# Patient Record
Sex: FEMALE | Race: ASIAN | NOT HISPANIC OR LATINO
[De-identification: names, ages, dates, MRNs, and addresses within clinical notes are randomized per-mention and may not be internally consistent; named-entity substitution may affect disease eponyms.]

---

## 2018-07-10 ENCOUNTER — APPOINTMENT (OUTPATIENT)
Dept: OTOLARYNGOLOGY | Facility: CLINIC | Age: 53
End: 2018-07-10

## 2021-11-09 ENCOUNTER — NON-APPOINTMENT (OUTPATIENT)
Age: 56
End: 2021-11-09

## 2021-11-10 ENCOUNTER — APPOINTMENT (OUTPATIENT)
Dept: ORTHOPEDIC SURGERY | Facility: CLINIC | Age: 56
End: 2021-11-10
Payer: COMMERCIAL

## 2021-11-10 DIAGNOSIS — Z83.3 FAMILY HISTORY OF DIABETES MELLITUS: ICD-10-CM

## 2021-11-10 DIAGNOSIS — Z86.39 PERSONAL HISTORY OF OTHER ENDOCRINE, NUTRITIONAL AND METABOLIC DISEASE: ICD-10-CM

## 2021-11-10 DIAGNOSIS — Z78.9 OTHER SPECIFIED HEALTH STATUS: ICD-10-CM

## 2021-11-10 DIAGNOSIS — M54.50 LOW BACK PAIN, UNSPECIFIED: ICD-10-CM

## 2021-11-10 DIAGNOSIS — M51.36 OTHER INTERVERTEBRAL DISC DEGENERATION, LUMBAR REGION: ICD-10-CM

## 2021-11-10 DIAGNOSIS — M77.32 CALCANEAL SPUR, LEFT FOOT: ICD-10-CM

## 2021-11-10 DIAGNOSIS — Z82.49 FAMILY HISTORY OF ISCHEMIC HEART DISEASE AND OTHER DISEASES OF THE CIRCULATORY SYSTEM: ICD-10-CM

## 2021-11-10 DIAGNOSIS — Z80.3 FAMILY HISTORY OF MALIGNANT NEOPLASM OF BREAST: ICD-10-CM

## 2021-11-10 PROCEDURE — 99204 OFFICE O/P NEW MOD 45 MIN: CPT

## 2021-11-10 RX ORDER — TIZANIDINE 2 MG/1
2 TABLET ORAL EVERY 8 HOURS
Qty: 30 | Refills: 2 | Status: ACTIVE | COMMUNITY
Start: 2021-11-10 | End: 1900-01-01

## 2021-11-10 RX ORDER — MELOXICAM 7.5 MG/1
7.5 TABLET ORAL DAILY
Qty: 30 | Refills: 0 | Status: ACTIVE | COMMUNITY
Start: 2021-11-10 | End: 1900-01-01

## 2021-11-10 RX ORDER — EZETIMIBE 10 MG/1
10 TABLET ORAL
Refills: 0 | Status: ACTIVE | COMMUNITY

## 2021-11-10 RX ORDER — MELATONIN 3 MG
TABLET ORAL
Refills: 0 | Status: ACTIVE | COMMUNITY

## 2021-11-10 NOTE — DISCUSSION/SUMMARY
[Medication Risks Reviewed] : Medication risks reviewed [de-identified] : The patient presents with back pain and difficulty changing positions. Her symptoms are suggestive of discogenic source of pain. Discussed the option of a Toradol injection today but she is blind. I recommended the use of a lumbar corset for supportive relief. Prescribed her meloxicam and tizanidine as well for symptomatic relief. She is traveling to [place] next Thursday and can come back for a Toradol injection if she wants prior to her travels. Prescribed her physical therapy as well. If her symptoms persist over the next 2 weeks MRI lumbar spine may be considered at that clinically he does not appear to have an occult fracture but this can be considered based on response to medications and treatment.\par \par The patient was educated regarding their condition, treatment options as well as prescribed course of treatment. \par Risks and benefits as well as alternatives to the proposed treatment were also provided to the patient \par They were given the opportunity to have all their questions answered to their satisfaction.\par \par Vital signs were reviewed with the patient and the patient was instructed to followup with their primary care provider for further management.\par \par Healthy lifestyle recommendations were also made including a tobacco free lifestyle, proper diet, and weight control.

## 2021-11-10 NOTE — CONSULT LETTER
[Dear  ___] : Dear  [unfilled], [Consult Letter:] : I had the pleasure of evaluating your patient, [unfilled]. [FreeTextEntry2] : Saeid Lloyd [FreeTextEntry1] : Thank you for this referral. I have enclosed my note for your review. Please feel free to contact my office if you have additional questions regarding this patient.\par \par Regards,\par Jaden Seals MD, FACS, FAAOS\par \par  of Orthopaedic Surgery\par [facility] [employer]\par Spinal Reconstruction Surgery\par Minimally Invasive Spinal Surgery\par [facility]

## 2021-11-10 NOTE — HISTORY OF PRESENT ILLNESS
[8] : a current pain level of 8/10 [Daily] : ~He/She~ states the symptoms seem to be occuring daily [Rest] : relieved by rest [Worsening] : worsening [___ wks] : [unfilled] week(s) ago [Prolonged Sitting] : worsened by prolonged sitting [de-identified] : Patient fell on 10/16/21 banged back of head on counter and landed on her buttocks states did not lose consciousness. Patient called her PCP told her that she was probably bruised 2 days later saw her PCP sent for xrays of lumbar spine negative for fractures given naprosyn stopped after a few days of taking due to upset stomach.\par Midline low back pain primarily.\par No PT yet. 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  [de-identified] : sitting to standing

## 2021-11-10 NOTE — PHYSICAL EXAM
[Normal] : Gait: normal [LE] : Sensory: Intact in bilateral lower extremities [1+] : left ankle jerk 1+ [DP] : dorsalis pedis 2+ and symmetric bilaterally [SLR] : negative straight leg raise [Plantar Reflex Right Only] : absent on the right [Plantar Reflex Left Only] : absent on the left [DTR Reflexes Clonus Of Right Ankle (___ Beats)] : absent on the right [DTR Reflexes Clonus Of Left Ankle (___ Beats)] : absent on the left [de-identified] : The pt is awake, alert and oriented to self, place and time, is comfortable and in no acute distress. Inspection of neck, back and lower extremities bilaterally reveals no rashes or ecchymotic lesions.  There is no obvious abnormal spinal curvature in the sagittal and coronal planes. There is no tenderness over the cervical, thoracic or lumbar spine, or the paraspinal or upper and lower extremities musculature. There is no sacroiliac tenderness. No greater trochanteric tenderness bilaterally. No atrophy or abnormal movements noted in the upper or lower extremities. There is no swelling noted in the upper or lower extremities bilaterally. No cervical lymphadenopathy noted anteriorly. No joint laxity noted in the upper and lower extremity joints bilaterally.\par Hip range of motion is degrees internal rotation 30° external rotation without pain. Full range of motion of the shoulders bilaterally with no significant pain\par There is no groin pain with hip internal rotation and a negative LYSSA test bilaterally.  [de-identified] : bilateral gluteal tenderness\par flex to mid tibia, ext 30 degrees\par LEFT PLANTATR TENDERNESS [de-identified] : X-rays of the lumbar spine sacrum and coccyx performed at [facility] imaging on October 20, 2021 demonstrate transitional vertebra with rudimentary S1-S2 disc and transitional level at S1. Sacroiliac joints are maintained. No acute fractures. These images were independently reviewed by me and discussed with the patient.

## 2024-02-02 ENCOUNTER — INPATIENT (INPATIENT)
Facility: HOSPITAL | Age: 59
LOS: 3 days | Discharge: HOME | End: 2024-02-06
Attending: INTERNAL MEDICINE | Admitting: INTERNAL MEDICINE
Payer: COMMERCIAL

## 2024-02-02 VITALS
HEART RATE: 96 BPM | RESPIRATION RATE: 18 BRPM | DIASTOLIC BLOOD PRESSURE: 96 MMHG | OXYGEN SATURATION: 99 % | SYSTOLIC BLOOD PRESSURE: 166 MMHG | TEMPERATURE: 98 F

## 2024-02-02 DIAGNOSIS — R07.9 CHEST PAIN, UNSPECIFIED: ICD-10-CM

## 2024-02-02 DIAGNOSIS — E11.65 TYPE 2 DIABETES MELLITUS WITH HYPERGLYCEMIA: ICD-10-CM

## 2024-02-02 DIAGNOSIS — Z29.9 ENCOUNTER FOR PROPHYLACTIC MEASURES, UNSPECIFIED: ICD-10-CM

## 2024-02-02 DIAGNOSIS — Z87.11 PERSONAL HISTORY OF PEPTIC ULCER DISEASE: ICD-10-CM

## 2024-02-02 DIAGNOSIS — E03.9 HYPOTHYROIDISM, UNSPECIFIED: ICD-10-CM

## 2024-02-02 DIAGNOSIS — I21.4 NON-ST ELEVATION (NSTEMI) MYOCARDIAL INFARCTION: ICD-10-CM

## 2024-02-02 DIAGNOSIS — E78.5 HYPERLIPIDEMIA, UNSPECIFIED: ICD-10-CM

## 2024-02-02 LAB
A1C WITH ESTIMATED AVERAGE GLUCOSE RESULT: 8.3 % — HIGH (ref 4–5.6)
ALBUMIN SERPL ELPH-MCNC: 4.4 G/DL — SIGNIFICANT CHANGE UP (ref 3.3–5)
ALP SERPL-CCNC: 127 U/L — HIGH (ref 40–120)
ALT FLD-CCNC: 24 U/L — SIGNIFICANT CHANGE UP (ref 4–33)
ANION GAP SERPL CALC-SCNC: 15 MMOL/L — HIGH (ref 7–14)
APTT BLD: 31.2 SEC — SIGNIFICANT CHANGE UP (ref 24.5–35.6)
AST SERPL-CCNC: 29 U/L — SIGNIFICANT CHANGE UP (ref 4–32)
BASOPHILS # BLD AUTO: 0.05 K/UL — SIGNIFICANT CHANGE UP (ref 0–0.2)
BASOPHILS NFR BLD AUTO: 0.5 % — SIGNIFICANT CHANGE UP (ref 0–2)
BILIRUB SERPL-MCNC: 0.4 MG/DL — SIGNIFICANT CHANGE UP (ref 0.2–1.2)
BUN SERPL-MCNC: 17 MG/DL — SIGNIFICANT CHANGE UP (ref 7–23)
CALCIUM SERPL-MCNC: 10.2 MG/DL — SIGNIFICANT CHANGE UP (ref 8.4–10.5)
CHLORIDE SERPL-SCNC: 98 MMOL/L — SIGNIFICANT CHANGE UP (ref 98–107)
CK MB BLD-MCNC: 8.7 % — HIGH (ref 0–2.5)
CK MB CFR SERPL CALC: 32.6 NG/ML — HIGH
CK SERPL-CCNC: 375 U/L — HIGH (ref 25–170)
CO2 SERPL-SCNC: 23 MMOL/L — SIGNIFICANT CHANGE UP (ref 22–31)
CREAT SERPL-MCNC: 0.7 MG/DL — SIGNIFICANT CHANGE UP (ref 0.5–1.3)
EGFR: 101 ML/MIN/1.73M2 — SIGNIFICANT CHANGE UP
EOSINOPHIL # BLD AUTO: 0.17 K/UL — SIGNIFICANT CHANGE UP (ref 0–0.5)
EOSINOPHIL NFR BLD AUTO: 1.6 % — SIGNIFICANT CHANGE UP (ref 0–6)
ESTIMATED AVERAGE GLUCOSE: 192 — SIGNIFICANT CHANGE UP
GLUCOSE BLDC GLUCOMTR-MCNC: 178 MG/DL — HIGH (ref 70–99)
GLUCOSE BLDC GLUCOMTR-MCNC: 213 MG/DL — HIGH (ref 70–99)
GLUCOSE SERPL-MCNC: 345 MG/DL — HIGH (ref 70–99)
HCT VFR BLD CALC: 40.7 % — SIGNIFICANT CHANGE UP (ref 34.5–45)
HCT VFR BLD CALC: 43.2 % — SIGNIFICANT CHANGE UP (ref 34.5–45)
HGB BLD-MCNC: 12.9 G/DL — SIGNIFICANT CHANGE UP (ref 11.5–15.5)
HGB BLD-MCNC: 14.1 G/DL — SIGNIFICANT CHANGE UP (ref 11.5–15.5)
IANC: 5.86 K/UL — SIGNIFICANT CHANGE UP (ref 1.8–7.4)
IMM GRANULOCYTES NFR BLD AUTO: 0.3 % — SIGNIFICANT CHANGE UP (ref 0–0.9)
INR BLD: 0.92 RATIO — SIGNIFICANT CHANGE UP (ref 0.85–1.18)
LYMPHOCYTES # BLD AUTO: 3.6 K/UL — HIGH (ref 1–3.3)
LYMPHOCYTES # BLD AUTO: 34.5 % — SIGNIFICANT CHANGE UP (ref 13–44)
MCHC RBC-ENTMCNC: 26.3 PG — LOW (ref 27–34)
MCHC RBC-ENTMCNC: 26.6 PG — LOW (ref 27–34)
MCHC RBC-ENTMCNC: 31.7 GM/DL — LOW (ref 32–36)
MCHC RBC-ENTMCNC: 32.6 GM/DL — SIGNIFICANT CHANGE UP (ref 32–36)
MCV RBC AUTO: 81.5 FL — SIGNIFICANT CHANGE UP (ref 80–100)
MCV RBC AUTO: 82.9 FL — SIGNIFICANT CHANGE UP (ref 80–100)
MONOCYTES # BLD AUTO: 0.72 K/UL — SIGNIFICANT CHANGE UP (ref 0–0.9)
MONOCYTES NFR BLD AUTO: 6.9 % — SIGNIFICANT CHANGE UP (ref 2–14)
NEUTROPHILS # BLD AUTO: 5.86 K/UL — SIGNIFICANT CHANGE UP (ref 1.8–7.4)
NEUTROPHILS NFR BLD AUTO: 56.2 % — SIGNIFICANT CHANGE UP (ref 43–77)
NRBC # BLD: 0 /100 WBCS — SIGNIFICANT CHANGE UP (ref 0–0)
NRBC # BLD: 0 /100 WBCS — SIGNIFICANT CHANGE UP (ref 0–0)
NRBC # FLD: 0 K/UL — SIGNIFICANT CHANGE UP (ref 0–0)
NRBC # FLD: 0 K/UL — SIGNIFICANT CHANGE UP (ref 0–0)
PLATELET # BLD AUTO: 240 K/UL — SIGNIFICANT CHANGE UP (ref 150–400)
PLATELET # BLD AUTO: 245 K/UL — SIGNIFICANT CHANGE UP (ref 150–400)
POTASSIUM SERPL-MCNC: 4 MMOL/L — SIGNIFICANT CHANGE UP (ref 3.5–5.3)
POTASSIUM SERPL-SCNC: 4 MMOL/L — SIGNIFICANT CHANGE UP (ref 3.5–5.3)
PROT SERPL-MCNC: 8.5 G/DL — HIGH (ref 6–8.3)
PROTHROM AB SERPL-ACNC: 10.4 SEC — SIGNIFICANT CHANGE UP (ref 9.5–13)
RBC # BLD: 4.91 M/UL — SIGNIFICANT CHANGE UP (ref 3.8–5.2)
RBC # BLD: 5.3 M/UL — HIGH (ref 3.8–5.2)
RBC # FLD: 12.9 % — SIGNIFICANT CHANGE UP (ref 10.3–14.5)
RBC # FLD: 13 % — SIGNIFICANT CHANGE UP (ref 10.3–14.5)
SODIUM SERPL-SCNC: 136 MMOL/L — SIGNIFICANT CHANGE UP (ref 135–145)
TROPONIN T, HIGH SENSITIVITY RESULT: 157 NG/L — CRITICAL HIGH
TROPONIN T, HIGH SENSITIVITY RESULT: 62 NG/L — CRITICAL HIGH
WBC # BLD: 10.43 K/UL — SIGNIFICANT CHANGE UP (ref 3.8–10.5)
WBC # BLD: 12.24 K/UL — HIGH (ref 3.8–10.5)
WBC # FLD AUTO: 10.43 K/UL — SIGNIFICANT CHANGE UP (ref 3.8–10.5)
WBC # FLD AUTO: 12.24 K/UL — HIGH (ref 3.8–10.5)

## 2024-02-02 PROCEDURE — 99223 1ST HOSP IP/OBS HIGH 75: CPT

## 2024-02-02 PROCEDURE — 71046 X-RAY EXAM CHEST 2 VIEWS: CPT | Mod: 26

## 2024-02-02 PROCEDURE — 99291 CRITICAL CARE FIRST HOUR: CPT

## 2024-02-02 RX ORDER — GLUCAGON INJECTION, SOLUTION 0.5 MG/.1ML
1 INJECTION, SOLUTION SUBCUTANEOUS ONCE
Refills: 0 | Status: DISCONTINUED | OUTPATIENT
Start: 2024-02-02 | End: 2024-02-04

## 2024-02-02 RX ORDER — ATORVASTATIN CALCIUM 80 MG/1
80 TABLET, FILM COATED ORAL AT BEDTIME
Refills: 0 | Status: DISCONTINUED | OUTPATIENT
Start: 2024-02-02 | End: 2024-02-03

## 2024-02-02 RX ORDER — HEPARIN SODIUM 5000 [USP'U]/ML
INJECTION INTRAVENOUS; SUBCUTANEOUS
Qty: 25000 | Refills: 0 | Status: DISCONTINUED | OUTPATIENT
Start: 2024-02-02 | End: 2024-02-02

## 2024-02-02 RX ORDER — SODIUM CHLORIDE 9 MG/ML
1000 INJECTION, SOLUTION INTRAVENOUS
Refills: 0 | Status: DISCONTINUED | OUTPATIENT
Start: 2024-02-02 | End: 2024-02-04

## 2024-02-02 RX ORDER — PANTOPRAZOLE SODIUM 20 MG/1
40 TABLET, DELAYED RELEASE ORAL
Refills: 0 | Status: DISCONTINUED | OUTPATIENT
Start: 2024-02-02 | End: 2024-02-06

## 2024-02-02 RX ORDER — ACETAMINOPHEN 500 MG
975 TABLET ORAL ONCE
Refills: 0 | Status: COMPLETED | OUTPATIENT
Start: 2024-02-02 | End: 2024-02-02

## 2024-02-02 RX ORDER — DEXTROSE 50 % IN WATER 50 %
25 SYRINGE (ML) INTRAVENOUS ONCE
Refills: 0 | Status: DISCONTINUED | OUTPATIENT
Start: 2024-02-02 | End: 2024-02-06

## 2024-02-02 RX ORDER — MORPHINE SULFATE 50 MG/1
2 CAPSULE, EXTENDED RELEASE ORAL EVERY 4 HOURS
Refills: 0 | Status: DISCONTINUED | OUTPATIENT
Start: 2024-02-02 | End: 2024-02-03

## 2024-02-02 RX ORDER — ACETAMINOPHEN 500 MG
650 TABLET ORAL EVERY 6 HOURS
Refills: 0 | Status: DISCONTINUED | OUTPATIENT
Start: 2024-02-02 | End: 2024-02-03

## 2024-02-02 RX ORDER — TICAGRELOR 90 MG/1
180 TABLET ORAL ONCE
Refills: 0 | Status: COMPLETED | OUTPATIENT
Start: 2024-02-02 | End: 2024-02-02

## 2024-02-02 RX ORDER — ASPIRIN/CALCIUM CARB/MAGNESIUM 324 MG
81 TABLET ORAL DAILY
Refills: 0 | Status: DISCONTINUED | OUTPATIENT
Start: 2024-02-03 | End: 2024-02-06

## 2024-02-02 RX ORDER — TICAGRELOR 90 MG/1
90 TABLET ORAL EVERY 12 HOURS
Refills: 0 | Status: DISCONTINUED | OUTPATIENT
Start: 2024-02-03 | End: 2024-02-05

## 2024-02-02 RX ORDER — DEXTROSE 50 % IN WATER 50 %
25 SYRINGE (ML) INTRAVENOUS ONCE
Refills: 0 | Status: DISCONTINUED | OUTPATIENT
Start: 2024-02-02 | End: 2024-02-04

## 2024-02-02 RX ORDER — LANOLIN ALCOHOL/MO/W.PET/CERES
3 CREAM (GRAM) TOPICAL AT BEDTIME
Refills: 0 | Status: DISCONTINUED | OUTPATIENT
Start: 2024-02-02 | End: 2024-02-06

## 2024-02-02 RX ORDER — INSULIN LISPRO 100/ML
VIAL (ML) SUBCUTANEOUS
Refills: 0 | Status: DISCONTINUED | OUTPATIENT
Start: 2024-02-02 | End: 2024-02-06

## 2024-02-02 RX ORDER — HEPARIN SODIUM 5000 [USP'U]/ML
3800 INJECTION INTRAVENOUS; SUBCUTANEOUS EVERY 6 HOURS
Refills: 0 | Status: DISCONTINUED | OUTPATIENT
Start: 2024-02-02 | End: 2024-02-03

## 2024-02-02 RX ORDER — INSULIN LISPRO 100/ML
VIAL (ML) SUBCUTANEOUS AT BEDTIME
Refills: 0 | Status: DISCONTINUED | OUTPATIENT
Start: 2024-02-02 | End: 2024-02-06

## 2024-02-02 RX ORDER — HEPARIN SODIUM 5000 [USP'U]/ML
3800 INJECTION INTRAVENOUS; SUBCUTANEOUS ONCE
Refills: 0 | Status: COMPLETED | OUTPATIENT
Start: 2024-02-02 | End: 2024-02-02

## 2024-02-02 RX ORDER — LIDOCAINE 4 G/100G
1 CREAM TOPICAL ONCE
Refills: 0 | Status: COMPLETED | OUTPATIENT
Start: 2024-02-02 | End: 2024-02-02

## 2024-02-02 RX ORDER — HEPARIN SODIUM 5000 [USP'U]/ML
INJECTION INTRAVENOUS; SUBCUTANEOUS
Qty: 25000 | Refills: 0 | Status: DISCONTINUED | OUTPATIENT
Start: 2024-02-02 | End: 2024-02-03

## 2024-02-02 RX ORDER — DEXTROSE 50 % IN WATER 50 %
12.5 SYRINGE (ML) INTRAVENOUS ONCE
Refills: 0 | Status: DISCONTINUED | OUTPATIENT
Start: 2024-02-02 | End: 2024-02-06

## 2024-02-02 RX ORDER — INSULIN LISPRO 100/ML
2 VIAL (ML) SUBCUTANEOUS ONCE
Refills: 0 | Status: COMPLETED | OUTPATIENT
Start: 2024-02-02 | End: 2024-02-02

## 2024-02-02 RX ORDER — LEVOTHYROXINE SODIUM 125 MCG
75 TABLET ORAL DAILY
Refills: 0 | Status: DISCONTINUED | OUTPATIENT
Start: 2024-02-03 | End: 2024-02-06

## 2024-02-02 RX ORDER — DEXTROSE 50 % IN WATER 50 %
15 SYRINGE (ML) INTRAVENOUS ONCE
Refills: 0 | Status: DISCONTINUED | OUTPATIENT
Start: 2024-02-02 | End: 2024-02-04

## 2024-02-02 RX ORDER — NITROGLYCERIN 6.5 MG
0.4 CAPSULE, EXTENDED RELEASE ORAL
Refills: 0 | Status: DISCONTINUED | OUTPATIENT
Start: 2024-02-02 | End: 2024-02-03

## 2024-02-02 RX ORDER — ASPIRIN/CALCIUM CARB/MAGNESIUM 324 MG
324 TABLET ORAL ONCE
Refills: 0 | Status: COMPLETED | OUTPATIENT
Start: 2024-02-02 | End: 2024-02-02

## 2024-02-02 RX ORDER — FAMOTIDINE 10 MG/ML
20 INJECTION INTRAVENOUS ONCE
Refills: 0 | Status: COMPLETED | OUTPATIENT
Start: 2024-02-02 | End: 2024-02-02

## 2024-02-02 RX ORDER — SODIUM CHLORIDE 9 MG/ML
1000 INJECTION INTRAMUSCULAR; INTRAVENOUS; SUBCUTANEOUS ONCE
Refills: 0 | Status: COMPLETED | OUTPATIENT
Start: 2024-02-02 | End: 2024-02-02

## 2024-02-02 RX ADMIN — FAMOTIDINE 20 MILLIGRAM(S): 10 INJECTION INTRAVENOUS at 16:53

## 2024-02-02 RX ADMIN — HEPARIN SODIUM 750 UNIT(S)/HR: 5000 INJECTION INTRAVENOUS; SUBCUTANEOUS at 22:23

## 2024-02-02 RX ADMIN — Medication 324 MILLIGRAM(S): at 18:43

## 2024-02-02 RX ADMIN — HEPARIN SODIUM 3800 UNIT(S): 5000 INJECTION INTRAVENOUS; SUBCUTANEOUS at 22:27

## 2024-02-02 RX ADMIN — MORPHINE SULFATE 2 MILLIGRAM(S): 50 CAPSULE, EXTENDED RELEASE ORAL at 23:25

## 2024-02-02 RX ADMIN — Medication 975 MILLIGRAM(S): at 17:23

## 2024-02-02 RX ADMIN — Medication 2 UNIT(S): at 19:54

## 2024-02-02 RX ADMIN — SODIUM CHLORIDE 1000 MILLILITER(S): 9 INJECTION INTRAMUSCULAR; INTRAVENOUS; SUBCUTANEOUS at 19:54

## 2024-02-02 RX ADMIN — LIDOCAINE 1 PATCH: 4 CREAM TOPICAL at 23:24

## 2024-02-02 RX ADMIN — TICAGRELOR 180 MILLIGRAM(S): 90 TABLET ORAL at 22:29

## 2024-02-02 RX ADMIN — Medication 975 MILLIGRAM(S): at 16:53

## 2024-02-02 RX ADMIN — Medication 30 MILLILITER(S): at 16:53

## 2024-02-02 NOTE — H&P ADULT - PROBLEM SELECTOR PLAN 5
Not on PPI - will start for now given will be on DAPT, hep gtt for ACS. No hx melena, hematochezia. Hgb 14.1.

## 2024-02-02 NOTE — ED ADULT NURSE REASSESSMENT NOTE - NS ED NURSE REASSESS COMMENT FT1
Report given to CDU RN. Pt transported over safety
Patient resting in stretcher A&Ox4, ambulatory, endorsing increase in mid sternal chest pain at this time, describes pain as" burning sensation". Denies SOB, dizziness.  MD made aware. Labs drawn and sent. FS obtained. 20G IV placed in the L AC. Medicated as ordered. Care plan continued. Comfort measures provided. Safety maintained. Awaiting lab results to start heparin infusion.

## 2024-02-02 NOTE — ED ADULT TRIAGE NOTE - CHIEF COMPLAINT QUOTE
c/o chest tightness onset this morning after taking a pil of ibuprofen for jaw and ear pain. phx of DM, high cholesterol.

## 2024-02-02 NOTE — ED ADULT NURSE NOTE - OBJECTIVE STATEMENT
Patient received to intake room 10C A&Ox4, ambulatory, accompanied by family member Hx DM, HLD c/o mid sternal chest tightness since this morning with radiation to the bilateral jaw and upper extremities. Patient endorsing taking 800mg of Motrin this morning for TMJ and states pain started after pain medication administration. RR equal and unlabored, denies SOB, N/V/D, abdominal pain, dizziness, fevers, chills. Abdomen, soft, non-tender, non-distended. 20G IV placed in the R AC, labs drawn and sent. Medicated as ordered. Care plan continued. Comfort measures provided. Safety maintained. Awaiting lab results.

## 2024-02-02 NOTE — ED PROVIDER NOTE - PROGRESS NOTE DETAILS
Shanika: troponin elevated, will tx ASA and place on cardiac monitor, plan for rpt troponin and admit to tele

## 2024-02-02 NOTE — H&P ADULT - NSHPLABSRESULTS_GEN_ALL_CORE
Personally reviewed labs:                        14.1   10.43 )-----------( 240      ( 02 Feb 2024 17:37 )             43.2     02-02-24 @ 17:37    136  |  98  |  17             --------------------------< 345<H>     4.0  |  23  | 0.70    eGFR AA: --  eGFR N-AA: --    Calcium: 10.2  Phosphorus: --  Magnesium: --    AST: 29    ALT: 24  AlkPhos: 127<H>  Protein: 8.5<H>  Albumin: 4.4  TBili: 0.4  D-Bili: --    Troponin 62--157      RADIOLOGY & ADDITIONAL TESTS:    EKG my independent interpretation: NSR, no ST changes    CXR my independent interpretation: clear lungs

## 2024-02-02 NOTE — H&P ADULT - NSHPPHYSICALEXAM_GEN_ALL_CORE
PHYSICAL EXAM:  Vital Signs Last 24 Hrs  T(C): 36.4 (02-02-24 @ 18:43)  T(F): 97.5 (02-02-24 @ 18:43), Max: 97.6 (02-02-24 @ 14:51)  HR: 68 (02-02-24 @ 18:43) (68 - 96)  BP: 149/79 (02-02-24 @ 18:43)  BP(mean): --  RR: 16 (02-02-24 @ 18:43) (16 - 18)  SpO2: 100% (02-02-24 @ 18:43) (99% - 100%)  Wt(kg): --    Constitutional: NAD, awake and alert, well developed  EYES: EOMI, conjunctiva clear  ENT:  Normal Hearing, no tonsillar exudates   Neck: Soft and supple , no thyromegaly   Respiratory: Breath sounds are clear bilaterally, No wheezing, rales or rhonchi, no tachypnea, no accessory muscle use  Cardiovascular: S1 and S2, regular rate and rhythm, no Murmurs, gallops or rubs, no JVD, no leg edema  Gastrointestinal: Bowel Sounds present, soft, nontender, nondistended, no guarding, no rebound  Extremities: No cyanosis or clubbing; warm to touch  Vascular: 2+ peripheral pulses lower ex  Neurological: No focal deficits, CN II-XII intact bilaterally, sensation to light touch intact in all extremities.   Musculoskeletal: 5/5 strength b/l upper and lower extremities; no joint swelling.  Skin: No rashes, no ulcerations

## 2024-02-02 NOTE — ED ADULT NURSE NOTE - NSFALLUNIVINTERV_ED_ALL_ED
Bed/Stretcher in lowest position, wheels locked, appropriate side rails in place/Call bell, personal items and telephone in reach/Instruct patient to call for assistance before getting out of bed/chair/stretcher/Non-slip footwear applied when patient is off stretcher/[place] to call system/Physically safe environment - no spills, clutter or unnecessary equipment/Purposeful proactive rounding/Room/bathroom lighting operational, light cord in reach

## 2024-02-02 NOTE — H&P ADULT - HISTORY OF PRESENT ILLNESS
58F with PMHx hypothyroidism, DM2, HLD, remote hx nonbleeding peptic ulcers presenting with chest pain today. The patient denies prior hx CAD, stents, MI, CHF. This morning she went to see dental regarding jaw pain and was told of TMJ. Today she took ibuprofen 800mg for this. One hour later she began having substernal chest tightness around 2PM. This pain radiated to bilateral shoulders and back. She has never had this pain before. She denies fevers, chills, cough, SOB, palpitations, LH, dizziness, abdominal pain, vomiting, diarrhea. Notes some heartburn as well. She has a remote hx of peptic ulcers decades ago but denies melena, hematochezia. At bedside, the chest pain is overall much improved but still with some back pain at this time.    In ED, troponin 62--157

## 2024-02-02 NOTE — H&P ADULT - PROBLEM SELECTOR PLAN 1
Troponin 62--157 and presented with substernal CP radiating to bilateral shoulders and back. Pain is improved in chest but still with back pain  -tele  -EKG NSR, no ST changes  -echo ordered  -trend cardiac enzymes q6-8hrs to peak  -nitro SL, morphine PRN  -s/p ASA and ticagrelor load  -c/w ASA 81mg qd and ticagrelor 90mg BID  -heparin gtt started  -discussed case with Dr. Wallace, and rec'd hep gtt, brillinta load and to treat for ACS. If worsening chest pain and rising cardiac enzymes, will consult CCU. CP improved at this time Troponin 62--157 and presented with substernal CP radiating to bilateral shoulders and back. Pain is improved in chest but still with back pain  -tele  -EKG NSR, no ST changes  -echo ordered  -trend cardiac enzymes q6-8hrs to peak  -repeat EKG 417AM NSR, no ST changes  -nitro SL, morphine PRN  -s/p ASA and ticagrelor load  -c/w ASA 81mg qd and ticagrelor 90mg BID  -heparin gtt started  -discussed case with Dr. Wallace, and rec'd hep gtt, brillinta load and to treat for ACS. If worsening chest pain and rising cardiac enzymes, will consult CCU. CP improved at this time Troponin 62--157 and presented with substernal CP radiating to bilateral shoulders and back. Pain is improved in chest but still with back pain  -tele  -EKG NSR, no ST changes  -echo ordered  -trend cardiac enzymes q6-8hrs to peak  -nitro SL, morphine PRN  -s/p ASA and ticagrelor load  -c/w ASA 81mg qd and ticagrelor 90mg BID  -heparin gtt started  -discussed case with Dr. Wallace, and rec'd hep gtt, brillinta load and to treat for ACS. If worsening chest pain and rising cardiac enzymes, will consult CCU. CP improved at this time  -addendum: repeat troponin 1194, EKG NSR, no ST changes at 417AM. Discussed with Dr. Wallace and recommending continuing current management. Troponin 62--157 and presented with substernal CP radiating to bilateral shoulders and back. Pain is improved in chest but still with back pain  -tele  -EKG NSR, no ST changes  -echo ordered  -trend cardiac enzymes q6-8hrs to peak  -nitro SL, morphine PRN  -s/p ASA and ticagrelor load  -c/w ASA 81mg qd and ticagrelor 90mg BID  -heparin gtt started  -discussed case with Dr. Wallace, and rec'd hep gtt, brillinta load and to treat for ACS. If worsening chest pain and rising cardiac enzymes, will consult CCU. CP improved at this time  -addendum: repeat troponin 1194, CKMB 102, EKG NSR, no ST changes at 417AM. Discussed with Dr. Wallace and recommending continuing current management. Troponin 62--157 and presented with substernal CP radiating to bilateral shoulders and back. Pain is improved in chest but still with back pain  -tele  -EKG NSR, no ST changes  -echo ordered  -trend cardiac enzymes q6-8hrs to peak  -nitro SL, morphine PRN  -s/p ASA and ticagrelor load  -c/w ASA 81mg qd and ticagrelor 90mg BID  -heparin gtt started  -discussed case with Dr. Wallace, and rec'd hep gtt, brillinta load and to treat for ACS. If worsening chest pain and rising cardiac enzymes, will consult CCU. CP improved at this time  -addendum: repeat troponin 1194, CKMB 102, EKG NSR, no ST changes at 417AM. Spoke with patient and chest pain greatly improved after nitroglycerin SL and back pain is resolved. Discussed with Dr. Wallace and recommending continuing current management.

## 2024-02-02 NOTE — ED PROVIDER NOTE - PHYSICAL EXAMINATION
VITALS: reviewed  GEN: NAD, A & O x 4  HEAD/EYES: NCAT, EOMI, anicteric sclerae,   ENT: mucus membranes moist, oropharynx WNL, trachea midline, TM pearly gray, no mastoid ttp, + click with jaw movement  RESP: lungs CTA with equal breath sounds bilaterally, chest wall nontender and atraumatic  CV: heart with reg rhythm S1, S2, distal pulses intact and symmetric bilaterally  ABDOMEN: normoactive bowel sounds, soft, nondistended, nontender, no palpable masses  : no CVAT  MSK: extremities atraumatic and nontender, no edema, no asymmetry.  SKIN: warm, dry, no rash, no bruising, no cyanosis. color appropriate for ethnicity  NEURO: alert, mentating appropriately, no facial asymmetry.   PSYCH: Affect appropriate

## 2024-02-02 NOTE — PHARMACOTHERAPY INTERVENTION NOTE - COMMENTS
Medication list updated in Outpatient Medication Record (OMR). OMR verified with [facility] pharmacy

## 2024-02-02 NOTE — H&P ADULT - ASSESSMENT
58F with PMHx hypothyroidism, DM2, HLD, remote hx nonbleeding peptic ulcers presenting with chest pain today. Admitted for NSTEMI

## 2024-02-02 NOTE — ED PROVIDER NOTE - CLINICAL SUMMARY MEDICAL DECISION MAKING FREE TEXT BOX
59 yo F hx peptic ulcers (not on meds), DM2, HLD, hypothyroidism, presenting with complaints of chest tightness that started about 45-60 min after eating and taking 800 mg ibuprofen today. Pt states she has been having increased jaw pain this week, seen by dentist and told that it was from TMJ. She does grind her teeth and has been clenching down at night. Denies exertional symptoms. No associated nausea/vomiting or diaphoresis. Pain has been constant since about 1 pm, radiating to shoulders and arms. No history of stress/echo. Exam remarkable for clicking at TMJ. ddx: gastritis 2/2 ibuprofen use, ACS, pancreatitis, biliary obstruction. No abdominal tenderness on exam, lower suspicion for pancreatitis/cholecystitis/choledocholithiasis. EKG sinus rhythm, intervals wnl, no DEYA. Plan for labs, including troponin, lipase. Tx GI cocktail, pain meds, and reassess.

## 2024-02-02 NOTE — ED PROVIDER NOTE - CRITICAL CARE ATTENDING CONTRIBUTION TO CARE
pt with nstemi, + troponin, no STEMI on ekg. will tx asa, heparin, place on cardiac monitor and admit to tele

## 2024-02-03 LAB
ALBUMIN SERPL ELPH-MCNC: 3.7 G/DL — SIGNIFICANT CHANGE UP (ref 3.3–5)
ALBUMIN SERPL ELPH-MCNC: 3.7 G/DL — SIGNIFICANT CHANGE UP (ref 3.3–5)
ALBUMIN SERPL ELPH-MCNC: 3.9 G/DL — SIGNIFICANT CHANGE UP (ref 3.3–5)
ALP SERPL-CCNC: 100 U/L — SIGNIFICANT CHANGE UP (ref 40–120)
ALP SERPL-CCNC: 105 U/L — SIGNIFICANT CHANGE UP (ref 40–120)
ALP SERPL-CCNC: 111 U/L — SIGNIFICANT CHANGE UP (ref 40–120)
ALT FLD-CCNC: 29 U/L — SIGNIFICANT CHANGE UP (ref 4–33)
ALT FLD-CCNC: 29 U/L — SIGNIFICANT CHANGE UP (ref 4–33)
ALT FLD-CCNC: 32 U/L — SIGNIFICANT CHANGE UP (ref 4–33)
ANION GAP SERPL CALC-SCNC: 12 MMOL/L — SIGNIFICANT CHANGE UP (ref 7–14)
ANION GAP SERPL CALC-SCNC: 13 MMOL/L — SIGNIFICANT CHANGE UP (ref 7–14)
ANION GAP SERPL CALC-SCNC: 14 MMOL/L — SIGNIFICANT CHANGE UP (ref 7–14)
APTT BLD: 111 SEC — HIGH (ref 24.5–35.6)
APTT BLD: 45.7 SEC — HIGH (ref 24.5–35.6)
APTT BLD: 77 SEC — HIGH (ref 24.5–35.6)
AST SERPL-CCNC: 75 U/L — HIGH (ref 4–32)
AST SERPL-CCNC: 88 U/L — HIGH (ref 4–32)
AST SERPL-CCNC: 98 U/L — HIGH (ref 4–32)
BASE EXCESS BLDV CALC-SCNC: -0.2 MMOL/L — SIGNIFICANT CHANGE UP (ref -2–3)
BILIRUB SERPL-MCNC: 0.4 MG/DL — SIGNIFICANT CHANGE UP (ref 0.2–1.2)
BILIRUB SERPL-MCNC: 0.6 MG/DL — SIGNIFICANT CHANGE UP (ref 0.2–1.2)
BILIRUB SERPL-MCNC: 0.6 MG/DL — SIGNIFICANT CHANGE UP (ref 0.2–1.2)
BLOOD GAS VENOUS COMPREHENSIVE RESULT: SIGNIFICANT CHANGE UP
BUN SERPL-MCNC: 10 MG/DL — SIGNIFICANT CHANGE UP (ref 7–23)
BUN SERPL-MCNC: 7 MG/DL — SIGNIFICANT CHANGE UP (ref 7–23)
BUN SERPL-MCNC: 7 MG/DL — SIGNIFICANT CHANGE UP (ref 7–23)
CALCIUM SERPL-MCNC: 8.7 MG/DL — SIGNIFICANT CHANGE UP (ref 8.4–10.5)
CALCIUM SERPL-MCNC: 8.9 MG/DL — SIGNIFICANT CHANGE UP (ref 8.4–10.5)
CALCIUM SERPL-MCNC: 9.1 MG/DL — SIGNIFICANT CHANGE UP (ref 8.4–10.5)
CHLORIDE BLDV-SCNC: 104 MMOL/L — SIGNIFICANT CHANGE UP (ref 96–108)
CHLORIDE SERPL-SCNC: 102 MMOL/L — SIGNIFICANT CHANGE UP (ref 98–107)
CHLORIDE SERPL-SCNC: 103 MMOL/L — SIGNIFICANT CHANGE UP (ref 98–107)
CHLORIDE SERPL-SCNC: 104 MMOL/L — SIGNIFICANT CHANGE UP (ref 98–107)
CK MB BLD-MCNC: 4.3 % — HIGH (ref 0–2.5)
CK MB BLD-MCNC: 7.3 % — HIGH (ref 0–2.5)
CK MB BLD-MCNC: 9.5 % — HIGH (ref 0–2.5)
CK MB CFR SERPL CALC: 102.1 NG/ML — HIGH
CK MB CFR SERPL CALC: 25.4 NG/ML — HIGH
CK MB CFR SERPL CALC: 77 NG/ML — HIGH
CK SERPL-CCNC: 1052 U/L — HIGH (ref 25–170)
CK SERPL-CCNC: 1075 U/L — HIGH (ref 25–170)
CK SERPL-CCNC: 587 U/L — HIGH (ref 25–170)
CO2 BLDV-SCNC: 26.1 MMOL/L — HIGH (ref 22–26)
CO2 SERPL-SCNC: 21 MMOL/L — LOW (ref 22–31)
CO2 SERPL-SCNC: 21 MMOL/L — LOW (ref 22–31)
CO2 SERPL-SCNC: 22 MMOL/L — SIGNIFICANT CHANGE UP (ref 22–31)
CREAT SERPL-MCNC: 0.6 MG/DL — SIGNIFICANT CHANGE UP (ref 0.5–1.3)
CREAT SERPL-MCNC: 0.6 MG/DL — SIGNIFICANT CHANGE UP (ref 0.5–1.3)
CREAT SERPL-MCNC: 0.61 MG/DL — SIGNIFICANT CHANGE UP (ref 0.5–1.3)
EGFR: 104 ML/MIN/1.73M2 — SIGNIFICANT CHANGE UP
EGFR: 105 ML/MIN/1.73M2 — SIGNIFICANT CHANGE UP
EGFR: 105 ML/MIN/1.73M2 — SIGNIFICANT CHANGE UP
GAS PNL BLDV: 136 MMOL/L — SIGNIFICANT CHANGE UP (ref 136–145)
GLUCOSE BLDC GLUCOMTR-MCNC: 173 MG/DL — HIGH (ref 70–99)
GLUCOSE BLDC GLUCOMTR-MCNC: 202 MG/DL — HIGH (ref 70–99)
GLUCOSE BLDC GLUCOMTR-MCNC: 215 MG/DL — HIGH (ref 70–99)
GLUCOSE BLDC GLUCOMTR-MCNC: 280 MG/DL — HIGH (ref 70–99)
GLUCOSE BLDV-MCNC: 208 MG/DL — HIGH (ref 70–99)
GLUCOSE SERPL-MCNC: 212 MG/DL — HIGH (ref 70–99)
GLUCOSE SERPL-MCNC: 246 MG/DL — HIGH (ref 70–99)
GLUCOSE SERPL-MCNC: 278 MG/DL — HIGH (ref 70–99)
HCO3 BLDV-SCNC: 25 MMOL/L — SIGNIFICANT CHANGE UP (ref 22–29)
HCT VFR BLD CALC: 37.6 % — SIGNIFICANT CHANGE UP (ref 34.5–45)
HCT VFR BLD CALC: 38.8 % — SIGNIFICANT CHANGE UP (ref 34.5–45)
HCT VFR BLDA CALC: 39 % — SIGNIFICANT CHANGE UP (ref 34.5–46.5)
HGB BLD CALC-MCNC: 12.9 G/DL — SIGNIFICANT CHANGE UP (ref 11.7–16.1)
HGB BLD-MCNC: 12.5 G/DL — SIGNIFICANT CHANGE UP (ref 11.5–15.5)
HGB BLD-MCNC: 12.7 G/DL — SIGNIFICANT CHANGE UP (ref 11.5–15.5)
LACTATE BLDV-MCNC: 1.9 MMOL/L — SIGNIFICANT CHANGE UP (ref 0.5–2)
MAGNESIUM SERPL-MCNC: 2.1 MG/DL — SIGNIFICANT CHANGE UP (ref 1.6–2.6)
MAGNESIUM SERPL-MCNC: 2.3 MG/DL — SIGNIFICANT CHANGE UP (ref 1.6–2.6)
MCHC RBC-ENTMCNC: 26.1 PG — LOW (ref 27–34)
MCHC RBC-ENTMCNC: 26.6 PG — LOW (ref 27–34)
MCHC RBC-ENTMCNC: 32.7 GM/DL — SIGNIFICANT CHANGE UP (ref 32–36)
MCHC RBC-ENTMCNC: 33.2 GM/DL — SIGNIFICANT CHANGE UP (ref 32–36)
MCV RBC AUTO: 79.7 FL — LOW (ref 80–100)
MCV RBC AUTO: 80 FL — SIGNIFICANT CHANGE UP (ref 80–100)
NRBC # BLD: 0 /100 WBCS — SIGNIFICANT CHANGE UP (ref 0–0)
NRBC # BLD: 0 /100 WBCS — SIGNIFICANT CHANGE UP (ref 0–0)
NRBC # FLD: 0 K/UL — SIGNIFICANT CHANGE UP (ref 0–0)
NRBC # FLD: 0 K/UL — SIGNIFICANT CHANGE UP (ref 0–0)
PCO2 BLDV: 41 MMHG — SIGNIFICANT CHANGE UP (ref 39–52)
PH BLDV: 7.39 — SIGNIFICANT CHANGE UP (ref 7.32–7.43)
PHOSPHATE SERPL-MCNC: 2.4 MG/DL — LOW (ref 2.5–4.5)
PHOSPHATE SERPL-MCNC: 3.4 MG/DL — SIGNIFICANT CHANGE UP (ref 2.5–4.5)
PLATELET # BLD AUTO: 222 K/UL — SIGNIFICANT CHANGE UP (ref 150–400)
PLATELET # BLD AUTO: 229 K/UL — SIGNIFICANT CHANGE UP (ref 150–400)
PO2 BLDV: 44 MMHG — SIGNIFICANT CHANGE UP (ref 25–45)
POTASSIUM BLDV-SCNC: 4.1 MMOL/L — SIGNIFICANT CHANGE UP (ref 3.5–5.1)
POTASSIUM SERPL-MCNC: 3.8 MMOL/L — SIGNIFICANT CHANGE UP (ref 3.5–5.3)
POTASSIUM SERPL-MCNC: 4.1 MMOL/L — SIGNIFICANT CHANGE UP (ref 3.5–5.3)
POTASSIUM SERPL-MCNC: 4.2 MMOL/L — SIGNIFICANT CHANGE UP (ref 3.5–5.3)
POTASSIUM SERPL-SCNC: 3.8 MMOL/L — SIGNIFICANT CHANGE UP (ref 3.5–5.3)
POTASSIUM SERPL-SCNC: 4.1 MMOL/L — SIGNIFICANT CHANGE UP (ref 3.5–5.3)
POTASSIUM SERPL-SCNC: 4.2 MMOL/L — SIGNIFICANT CHANGE UP (ref 3.5–5.3)
PROT SERPL-MCNC: 7.1 G/DL — SIGNIFICANT CHANGE UP (ref 6–8.3)
PROT SERPL-MCNC: 7.2 G/DL — SIGNIFICANT CHANGE UP (ref 6–8.3)
PROT SERPL-MCNC: 7.5 G/DL — SIGNIFICANT CHANGE UP (ref 6–8.3)
RBC # BLD: 4.7 M/UL — SIGNIFICANT CHANGE UP (ref 3.8–5.2)
RBC # BLD: 4.87 M/UL — SIGNIFICANT CHANGE UP (ref 3.8–5.2)
RBC # FLD: 13.2 % — SIGNIFICANT CHANGE UP (ref 10.3–14.5)
RBC # FLD: 13.5 % — SIGNIFICANT CHANGE UP (ref 10.3–14.5)
SAO2 % BLDV: 74.1 % — SIGNIFICANT CHANGE UP (ref 67–88)
SODIUM SERPL-SCNC: 137 MMOL/L — SIGNIFICANT CHANGE UP (ref 135–145)
SODIUM SERPL-SCNC: 137 MMOL/L — SIGNIFICANT CHANGE UP (ref 135–145)
SODIUM SERPL-SCNC: 138 MMOL/L — SIGNIFICANT CHANGE UP (ref 135–145)
TROPONIN T, HIGH SENSITIVITY RESULT: 1194 NG/L — CRITICAL HIGH
TROPONIN T, HIGH SENSITIVITY RESULT: 1245 NG/L — CRITICAL HIGH
TROPONIN T, HIGH SENSITIVITY RESULT: 1272 NG/L — CRITICAL HIGH
TROPONIN T, HIGH SENSITIVITY RESULT: 641 NG/L — CRITICAL HIGH
TSH SERPL-MCNC: 2.17 UIU/ML — SIGNIFICANT CHANGE UP (ref 0.27–4.2)
WBC # BLD: 12.2 K/UL — HIGH (ref 3.8–10.5)
WBC # BLD: 15.87 K/UL — HIGH (ref 3.8–10.5)
WBC # FLD AUTO: 12.2 K/UL — HIGH (ref 3.8–10.5)
WBC # FLD AUTO: 15.87 K/UL — HIGH (ref 3.8–10.5)

## 2024-02-03 PROCEDURE — 93306 TTE W/DOPPLER COMPLETE: CPT | Mod: 26

## 2024-02-03 PROCEDURE — 93010 ELECTROCARDIOGRAM REPORT: CPT

## 2024-02-03 RX ORDER — TICAGRELOR 90 MG/1
1 TABLET ORAL
Qty: 60 | Refills: 0
Start: 2024-02-03 | End: 2024-03-03

## 2024-02-03 RX ORDER — TRAMADOL HYDROCHLORIDE 50 MG/1
25 TABLET ORAL ONCE
Refills: 0 | Status: DISCONTINUED | OUTPATIENT
Start: 2024-02-03 | End: 2024-02-03

## 2024-02-03 RX ORDER — METOPROLOL TARTRATE 50 MG
12.5 TABLET ORAL
Refills: 0 | Status: DISCONTINUED | OUTPATIENT
Start: 2024-02-03 | End: 2024-02-04

## 2024-02-03 RX ORDER — MORPHINE SULFATE 50 MG/1
1 CAPSULE, EXTENDED RELEASE ORAL ONCE
Refills: 0 | Status: DISCONTINUED | OUTPATIENT
Start: 2024-02-03 | End: 2024-02-03

## 2024-02-03 RX ORDER — ACETAMINOPHEN 500 MG
650 TABLET ORAL EVERY 4 HOURS
Refills: 0 | Status: DISCONTINUED | OUTPATIENT
Start: 2024-02-03 | End: 2024-02-06

## 2024-02-03 RX ORDER — METOPROLOL TARTRATE 50 MG
12.5 TABLET ORAL
Refills: 0 | Status: DISCONTINUED | OUTPATIENT
Start: 2024-02-03 | End: 2024-02-03

## 2024-02-03 RX ORDER — POTASSIUM CHLORIDE 20 MEQ
20 PACKET (EA) ORAL ONCE
Refills: 0 | Status: COMPLETED | OUTPATIENT
Start: 2024-02-03 | End: 2024-02-03

## 2024-02-03 RX ORDER — HEPARIN SODIUM 5000 [USP'U]/ML
900 INJECTION INTRAVENOUS; SUBCUTANEOUS
Qty: 25000 | Refills: 0 | Status: DISCONTINUED | OUTPATIENT
Start: 2024-02-03 | End: 2024-02-04

## 2024-02-03 RX ORDER — NITROGLYCERIN 6.5 MG
20 CAPSULE, EXTENDED RELEASE ORAL
Qty: 50 | Refills: 0 | Status: DISCONTINUED | OUTPATIENT
Start: 2024-02-03 | End: 2024-02-04

## 2024-02-03 RX ADMIN — Medication 650 MILLIGRAM(S): at 05:14

## 2024-02-03 RX ADMIN — Medication 650 MILLIGRAM(S): at 17:13

## 2024-02-03 RX ADMIN — Medication 3: at 12:28

## 2024-02-03 RX ADMIN — Medication 0.4 MILLIGRAM(S): at 05:14

## 2024-02-03 RX ADMIN — LIDOCAINE 1 PATCH: 4 CREAM TOPICAL at 07:02

## 2024-02-03 RX ADMIN — Medication 650 MILLIGRAM(S): at 18:13

## 2024-02-03 RX ADMIN — Medication 0.4 MILLIGRAM(S): at 11:08

## 2024-02-03 RX ADMIN — PANTOPRAZOLE SODIUM 40 MILLIGRAM(S): 20 TABLET, DELAYED RELEASE ORAL at 05:15

## 2024-02-03 RX ADMIN — MORPHINE SULFATE 1 MILLIGRAM(S): 50 CAPSULE, EXTENDED RELEASE ORAL at 02:38

## 2024-02-03 RX ADMIN — Medication 12.5 MILLIGRAM(S): at 18:43

## 2024-02-03 RX ADMIN — Medication 81 MILLIGRAM(S): at 11:20

## 2024-02-03 RX ADMIN — Medication 20 MILLIEQUIVALENT(S): at 18:43

## 2024-02-03 RX ADMIN — Medication 75 MICROGRAM(S): at 05:15

## 2024-02-03 RX ADMIN — HEPARIN SODIUM 8 UNIT(S)/HR: 5000 INJECTION INTRAVENOUS; SUBCUTANEOUS at 23:55

## 2024-02-03 RX ADMIN — TRAMADOL HYDROCHLORIDE 25 MILLIGRAM(S): 50 TABLET ORAL at 23:42

## 2024-02-03 RX ADMIN — MORPHINE SULFATE 1 MILLIGRAM(S): 50 CAPSULE, EXTENDED RELEASE ORAL at 01:38

## 2024-02-03 RX ADMIN — HEPARIN SODIUM 700 UNIT(S)/HR: 5000 INJECTION INTRAVENOUS; SUBCUTANEOUS at 09:36

## 2024-02-03 RX ADMIN — TICAGRELOR 90 MILLIGRAM(S): 90 TABLET ORAL at 05:15

## 2024-02-03 RX ADMIN — TICAGRELOR 90 MILLIGRAM(S): 90 TABLET ORAL at 17:15

## 2024-02-03 RX ADMIN — HEPARIN SODIUM 700 UNIT(S)/HR: 5000 INJECTION INTRAVENOUS; SUBCUTANEOUS at 15:25

## 2024-02-03 RX ADMIN — HEPARIN SODIUM 9 UNIT(S)/HR: 5000 INJECTION INTRAVENOUS; SUBCUTANEOUS at 19:59

## 2024-02-03 RX ADMIN — Medication 6 MICROGRAM(S)/MIN: at 15:28

## 2024-02-03 RX ADMIN — HEPARIN SODIUM 9 UNIT(S)/HR: 5000 INJECTION INTRAVENOUS; SUBCUTANEOUS at 15:30

## 2024-02-03 RX ADMIN — Medication 6 MICROGRAM(S)/MIN: at 19:59

## 2024-02-03 RX ADMIN — TRAMADOL HYDROCHLORIDE 25 MILLIGRAM(S): 50 TABLET ORAL at 22:42

## 2024-02-03 RX ADMIN — Medication 40 MILLIGRAM(S): at 22:42

## 2024-02-03 RX ADMIN — Medication 2: at 17:15

## 2024-02-03 RX ADMIN — Medication 650 MILLIGRAM(S): at 11:20

## 2024-02-03 RX ADMIN — Medication 650 MILLIGRAM(S): at 12:20

## 2024-02-03 RX ADMIN — Medication 650 MILLIGRAM(S): at 06:14

## 2024-02-03 RX ADMIN — HEPARIN SODIUM 700 UNIT(S)/HR: 5000 INJECTION INTRAVENOUS; SUBCUTANEOUS at 06:33

## 2024-02-03 NOTE — CONSULT NOTE ADULT - SUBJECTIVE AND OBJECTIVE BOX
CARDIOLOGY CONSULT NOTE - DR. ARAIZA        Date of Service: 02-03-24 @ 09:45      HPI:  58F with PMHx hypothyroidism, DM2, HLD, remote hx nonbleeding peptic ulcers presenting with chest pain today. The patient denies prior hx CAD, stents, MI, CHF. This morning she went to see dental regarding jaw pain and was told of TMJ. Today she took ibuprofen 800mg for this. One hour later she began having substernal chest tightness around 2PM. This pain radiated to bilateral shoulders and back. She has never had this pain before. She denies fevers, chills, cough, SOB, palpitations, LH, dizziness, abdominal pain, vomiting, diarrhea. Notes some heartburn as well. She has a remote hx of peptic ulcers decades ago but denies melena, hematochezia. At bedside, the chest pain is overall much improved but still with some back pain at this time.      now chest pain free  no back pain, dyspnea    In ED, troponin 62--157 (02 Feb 2024 22:58)        PAST MEDICAL & SURGICAL HISTORY:  Diabetes mellitus      HLD (hyperlipidemia)      Hypothyroidism      No significant past surgical history            PREVIOUS DIAGNOSTIC TESTING:    [ ] Echocardiogram:  [ ]  Catheterization:  [ ] Stress Test:  	    MEDICATIONS:    Home Medications:  levothyroxine 75 mcg (0.075 mg) oral tablet: 1 tab(s) orally once a day (02 Feb 2024 21:09)  MetFORMIN (Eqv-Glucophage XR) 500 mg oral tablet, extended release: 2 tab(s) orally once a day (02 Feb 2024 21:09)  pravastatin 40 mg oral tablet: 1 tab(s) orally once a day (02 Feb 2024 21:09)      MEDICATIONS  (STANDING):  aspirin enteric coated 81 milliGRAM(s) Oral daily  atorvastatin 80 milliGRAM(s) Oral at bedtime  dextrose 5%. 1000 milliLiter(s) (50 mL/Hr) IV Continuous <Continuous>  dextrose 5%. 1000 milliLiter(s) (100 mL/Hr) IV Continuous <Continuous>  dextrose 50% Injectable 25 Gram(s) IV Push once  dextrose 50% Injectable 25 Gram(s) IV Push once  dextrose 50% Injectable 12.5 Gram(s) IV Push once  glucagon  Injectable 1 milliGRAM(s) IntraMuscular once  heparin  Infusion.  Unit(s)/Hr (7.5 mL/Hr) IV Continuous <Continuous>  insulin lispro (ADMELOG) corrective regimen sliding scale   SubCutaneous at bedtime  insulin lispro (ADMELOG) corrective regimen sliding scale   SubCutaneous three times a day before meals  levothyroxine 75 MICROGram(s) Oral daily  pantoprazole    Tablet 40 milliGRAM(s) Oral before breakfast  ticagrelor 90 milliGRAM(s) Oral every 12 hours      FAMILY HISTORY:  FH: heart disease        SOCIAL HISTORY:    [x ] Non-smoker  [ ] Smoker  [ ] Alcohol    Allergies    amoxicillin (Hives)  sulfa drugs (Hives)  Originally Entered as [Unknown] reaction to [sulfur] (Unknown)    Intolerances    Farxiga (Unknown)  	    REVIEW OF SYSTEMS:  CONSTITUTIONAL: No fever, weight loss, or fatigue  EYES: No eye pain, visual disturbances, or discharge  ENMT:  No difficulty hearing, tinnitus, vertigo; No sinus or throat pain  NECK: No pain or stiffness  RESPIRATORY: No cough, wheezing, chills or hemoptysis; No Shortness of Breath  CARDIOVASCULAR: as HPI  GASTROINTESTINAL: No abdominal or epigastric pain. No nausea, vomiting, or hematemesis; No diarrhea or constipation. No melena or hematochezia.  GENITOURINARY: No dysuria, frequency, hematuria, or incontinence  NEUROLOGICAL: No headaches, memory loss, loss of strength, numbness, or tremors  SKIN: No itching, burning, rashes, or lesions   	  [ ] All others negative	  [ ] Unable to obtain    PHYSICAL EXAM:    T(C): 36.5 (02-03-24 @ 05:14), Max: 36.6 (02-03-24 @ 01:40)  HR: 79 (02-03-24 @ 05:20) (68 - 96)  BP: 134/76 (02-03-24 @ 05:20) (134/76 - 166/96)  RR: 18 (02-03-24 @ 05:14) (16 - 19)  SpO2: 99% (02-03-24 @ 05:14) (99% - 100%)  Wt(kg): --  I&O's Summary    Daily     Daily     Appearance: Normal	  Psychiatry: A & O x 3, Mood & affect appropriate  HEENT:   Normal oral mucosa, PERRL, EOMI	  Lymphatic: No lymphadenopathy  Cardiovascular: Normal S1 S2,RRR, No JVD, No murmurs  Respiratory: Lungs clear to auscultation	  Gastrointestinal:  Soft, Non-tender, + BS	  Skin: No rashes, No ecchymoses, No cyanosis	  Neurologic: Non-focal  Extremities: Normal range of motion, No clubbing, cyanosis or edema  Vascular: Peripheral pulses palpable 2+ bilaterally    TELEMETRY: 	    ECG:  	sr  no acute ischemic st abnl   RADIOLOGY:  OTHER: 	  	  LABS:	 	    CARDIAC MARKERS:        proBNP:     Lipid Profile:   HgA1c:   TSH:                           12.5   15.87 )-----------( 229      ( 03 Feb 2024 04:10 )             37.6     02-03    137  |  102  |  10  ----------------------------<  278<H>  4.1   |  21<L>  |  0.60    Ca    8.7      03 Feb 2024 04:10    TPro  7.1  /  Alb  3.7  /  TBili  0.4  /  DBili  x   /  AST  98<H>  /  ALT  29  /  AlkPhos  100  02-03    PT/INR - ( 02 Feb 2024 20:45 )   PT: 10.4 sec;   INR: 0.92 ratio         PTT - ( 03 Feb 2024 04:10 )  PTT:77.0 sec    Creatinine: 0.60 mg/dL (02-03-24 @ 04:10)  Creatinine: 0.70 mg/dL (02-02-24 @ 17:37)        ASSESSMENT/PLAN:

## 2024-02-03 NOTE — CONSULT NOTE ADULT - CONSULT REQUESTED BY NAME
Patient has known LBBB. Saw cardiologist in 2017 who stated patient did not need cardiac workup unless symptomatic. Patient has had back and knee replacement since seeing cardiologist. EKG today is the same. Patient has no cardiac symptoms.     Patient to apply Chlorhexadine wipes  to surgical area (as instructed) the night before procedure and the AM of procedure. Wipes provided.    Patient instructed to drink 20 ounces (or until full) of Gatorade and it needs to be completed 1 hour before given arrival time for procedure (NO RED Gatorade)    Patient verbalized understanding.    Patient passed memory screen 5/5  
er

## 2024-02-03 NOTE — PATIENT PROFILE ADULT - FALL HARM RISK - CONCLUSION
PDMP reviewed; no aberrant behavior identified, prescription authorized.    Karishma Hallman MD  10/11/23     Fall with Harm Risk

## 2024-02-03 NOTE — CONSULT NOTE ADULT - ASSESSMENT
A/.P    58F with PMHx hypothyroidism, DM2, HLD, remote hx nonbleeding peptic ulcers presenting with chest pain today. Admitted for NSTEMI    #NSTEMI (non-ST elevation myocardial infarction).   -hd stable, now chest pain free  -trop still rising   -no acute ST abnl on ecg  -continue iv hep, asa, brilinta  -check echo   -d/w ccu to arrange tx to ccu for further monitoring in setting of nstemi   -plan for cath  -d/w patient r/b/a of cath, agrees to proceed    #Type 2 diabetes mellitus with hyperglycemia, without long-term current use of insulin.   -Hold metformin  -med eval called dr velazquez    #HLD (hyperlipidemia).   -cont statin    #Hypothyroidism.   -levothyroxine 75mcg qd.    #History of gastric ulcer.   -ppi      d/w fam at bedside  d/w sister via phone  d/w ccu team       80 minutes spent on total encounter; more than 50% of the visit was spent counseling and/or coordinating care by the attending physician.      ACP- Advanced Care Planning  -Advanced care planning discussed with patient. Advanced care planning forms discussed with patient and/or family.  Risks, benefits, and alternatives of medical/cardiac procedures were discussed in detail with all questions answered.  30 minutes were spent addressing advance care planning.

## 2024-02-03 NOTE — CHART NOTE - NSCHARTNOTEFT_GEN_A_CORE
Reason for CCU Consult:     HISTORY OF PRESENT ILLNESS:  Patient is a 58y old  Female who presents with a chief complaint of chest pain (03 Feb 2024 09:44)      Allergies    amoxicillin (Hives)  sulfa drugs (Hives)  Originally Entered as [Unknown] reaction to [sulfur] (Unknown)    Intolerances    Farxiga (Unknown)      PAST MEDICAL & SURGICAL HISTORY:  Diabetes mellitus      HLD (hyperlipidemia)      Hypothyroidism      No significant past surgical history          MEDICATIONS  (STANDING):  aspirin enteric coated 81 milliGRAM(s) Oral daily  atorvastatin 80 milliGRAM(s) Oral at bedtime  dextrose 5%. 1000 milliLiter(s) (100 mL/Hr) IV Continuous <Continuous>  dextrose 5%. 1000 milliLiter(s) (50 mL/Hr) IV Continuous <Continuous>  dextrose 50% Injectable 25 Gram(s) IV Push once  dextrose 50% Injectable 25 Gram(s) IV Push once  dextrose 50% Injectable 12.5 Gram(s) IV Push once  glucagon  Injectable 1 milliGRAM(s) IntraMuscular once  heparin  Infusion.  Unit(s)/Hr (7.5 mL/Hr) IV Continuous <Continuous>  insulin lispro (ADMELOG) corrective regimen sliding scale   SubCutaneous at bedtime  insulin lispro (ADMELOG) corrective regimen sliding scale   SubCutaneous three times a day before meals  levothyroxine 75 MICROGram(s) Oral daily  pantoprazole    Tablet 40 milliGRAM(s) Oral before breakfast  ticagrelor 90 milliGRAM(s) Oral every 12 hours    MEDICATIONS  (PRN):  acetaminophen     Tablet .. 650 milliGRAM(s) Oral every 6 hours PRN Temp greater or equal to 38C (100.4F), Mild Pain (1 - 3)  aluminum hydroxide/magnesium hydroxide/simethicone Suspension 30 milliLiter(s) Oral every 4 hours PRN Dyspepsia  dextrose Oral Gel 15 Gram(s) Oral once PRN Blood Glucose LESS THAN 70 milliGRAM(s)/deciliter  heparin   Injectable 3800 Unit(s) IV Push every 6 hours PRN For aPTT less than 40  melatonin 3 milliGRAM(s) Oral at bedtime PRN Insomnia  morphine  - Injectable 2 milliGRAM(s) IV Push every 4 hours PRN Severe Pain (7 - 10)  nitroglycerin     SubLingual 0.4 milliGRAM(s) SubLingual every 5 minutes PRN Chest Pain      Drug Dosing Weight    Weight (kg): 65.317 (02 Feb 2024 19:31)    FAMILY HISTORY:  FH: heart disease        SOCIAL HISTORY:  Smoker[ ]  Non smoker[ ]  Alcohol [ ]      ADVANCE DIRECTIVES:    CONSTITUTIONAL: No fevers, No chills, No fatigue, No weight gain  EYES: No vision changes   ENT: No congestion, No ear pain, No sore throat.  NECK: No pain, No stiffness  RESPIRATORY: No shortness of breath, No cough, No wheezing, No hemoptysis  CARDIOVASCULAR: No chest pain. No palpitations, No BRO, No orthopnea, No paroxysmal nocturnal dyspnea, No pleuritic pain  GASTROINTESTINAL: No abdominal pain, No nausea, No vomiting, No hematemesis, No diarrhea No constipation. No melena  GENITOURINARY: No dysuria, No frequency, No incontinence, No hematuria  NEUROLOGICAL: No dizziness, No lightheadedness, No syncope, No LOC, No headache, No numbness or weakness  MUSCULOSKELETAL: No Edema, No joint pain, No joint swelling.  PSYCHIATRIC: No anxiety, No depression  DERMATOLOGY: No diaphoresis. No itching, No rashes, No pressure ulcers  HEME/LYMPH: No easy bruising, or bleeding gums    All other review of systems is negative unless indicated above.    Appearance: NAD, no distress  HEENT: Moist Mucous Membranes, Anicteric, PERRL, EOMI  Cardiovascular: Regular rate and rhythm, Normal S1 S2, No JVD, No murmurs  Respiratory: Lungs clear to auscultation. No rales, No rhonchi, No wheezing. No tenderness to palpation  Gastrointestinal:  Soft, Non-tender, + BS  Neurologic: Non-focal, A&Ox3  Skin: Warm and dry, No rashes, No ecchymosis, No cyanosis  Musculoskeletal: No clubbing, No cyanosis, No edema  Vascular: Peripheral pulses palpable 2+ bilaterally  Psychiatry: Mood & affect appropriate      	    		            ICU Vital Signs Last 24 Hrs  T(C): 36.7 (03 Feb 2024 10:00), Max: 36.7 (03 Feb 2024 10:00)  T(F): 98.1 (03 Feb 2024 10:00), Max: 98.1 (03 Feb 2024 10:00)  HR: 94 (03 Feb 2024 10:00) (68 - 96)  BP: 131/70 (03 Feb 2024 10:00) (131/70 - 166/96)  BP(mean): --  ABP: --  ABP(mean): --  RR: 17 (03 Feb 2024 10:00) (16 - 19)  SpO2: 100% (03 Feb 2024 10:00) (99% - 100%)    O2 Parameters below as of 03 Feb 2024 10:00  Patient On (Oxygen Delivery Method): room air                LABS:  CBC Full  -  ( 03 Feb 2024 04:10 )  WBC Count : 15.87 K/uL  RBC Count : 4.70 M/uL  Hemoglobin : 12.5 g/dL  Hematocrit : 37.6 %  Platelet Count - Automated : 229 K/uL  Mean Cell Volume : 80.0 fL  Mean Cell Hemoglobin : 26.6 pg  Mean Cell Hemoglobin Concentration : 33.2 gm/dL  Auto Neutrophil # : x  Auto Lymphocyte # : x  Auto Monocyte # : x  Auto Eosinophil # : x  Auto Basophil # : x  Auto Neutrophil % : x  Auto Lymphocyte % : x  Auto Monocyte % : x  Auto Eosinophil % : x  Auto Basophil % : x    02-03    137  |  102  |  10  ----------------------------<  278<H>  4.1   |  21<L>  |  0.60    Ca    8.7      03 Feb 2024 04:10    TPro  7.1  /  Alb  3.7  /  TBili  0.4  /  DBili  x   /  AST  98<H>  /  ALT  29  /  AlkPhos  100  02-03    CARDIAC MARKERS ( 03 Feb 2024 10:37 )  x     / x     / 1052 U/L / x     / 77.0 ng/mL  CARDIAC MARKERS ( 03 Feb 2024 04:10 )  x     / x     / 1075 U/L / x     / 102.1 ng/mL  CARDIAC MARKERS ( 02 Feb 2024 20:45 )  x     / x     / 375 U/L / x     / 32.6 ng/mL  CARDIAC MARKERS ( 02 Feb 2024 17:37 )  x     / x     / 149 U/L / x     / 9.9 ng/mL      CAPILLARY BLOOD GLUCOSE      POCT Blood Glucose.: 280 mg/dL (03 Feb 2024 12:19)    PT/INR - ( 02 Feb 2024 20:45 )   PT: 10.4 sec;   INR: 0.92 ratio         PTT - ( 03 Feb 2024 04:10 )  PTT:77.0 sec  Urinalysis Basic - ( 03 Feb 2024 04:10 )    Color: x / Appearance: x / SG: x / pH: x  Gluc: 278 mg/dL / Ketone: x  / Bili: x / Urobili: x   Blood: x / Protein: x / Nitrite: x   Leuk Esterase: x / RBC: x / WBC x   Sq Epi: x / Non Sq Epi: x / Bacteria: x        I&O's Detail      EKG:    ECHO      RADIOLOGY STUDIES    CXR:     CT SCAN:     ULTRASOUND:     ASSESSMENT      PLAN          Case discussed with Cardiology fellow Reason for CCU Consult:   NSTEMI with rising troponins  HISTORY OF PRESENT ILLNESS:  Patient is a 58y old  Female who presents with a chief complaint of chest pain (03 Feb 2024 09:44)  58F with PMHx hypothyroidism, DM2, HLD, remote hx nonbleeding peptic ulcers presenting with chest pain today. The patient denies prior hx CAD, stents, MI, CHF. This morning she went to see dental regarding jaw pain and was told of TMJ. Today she took ibuprofen 800mg for this. One hour later she began having substernal chest tightness around 2PM. This pain radiated to bilateral shoulders and back. She has never had this pain before. She denies fevers, chills, cough, SOB, palpitations, LH, dizziness, abdominal pain, vomiting, diarrhea. Notes some heartburn as well. She has a remote hx of peptic ulcers decades ago but denies melena, hematochezia. At bedside, the chest pain is overall much improved but still with some back pain at this time.    Allergies    amoxicillin (Hives)  sulfa drugs (Hives)  Originally Entered as [Unknown] reaction to [sulfur] (Unknown)    Intolerances    Farxiga (Unknown)      PAST MEDICAL & SURGICAL HISTORY:  Diabetes mellitus      HLD (hyperlipidemia)      Hypothyroidism      No significant past surgical history          MEDICATIONS  (STANDING):  aspirin enteric coated 81 milliGRAM(s) Oral daily  atorvastatin 80 milliGRAM(s) Oral at bedtime  dextrose 5%. 1000 milliLiter(s) (100 mL/Hr) IV Continuous <Continuous>  dextrose 5%. 1000 milliLiter(s) (50 mL/Hr) IV Continuous <Continuous>  dextrose 50% Injectable 25 Gram(s) IV Push once  dextrose 50% Injectable 25 Gram(s) IV Push once  dextrose 50% Injectable 12.5 Gram(s) IV Push once  glucagon  Injectable 1 milliGRAM(s) IntraMuscular once  heparin  Infusion.  Unit(s)/Hr (7.5 mL/Hr) IV Continuous <Continuous>  insulin lispro (ADMELOG) corrective regimen sliding scale   SubCutaneous at bedtime  insulin lispro (ADMELOG) corrective regimen sliding scale   SubCutaneous three times a day before meals  levothyroxine 75 MICROGram(s) Oral daily  pantoprazole    Tablet 40 milliGRAM(s) Oral before breakfast  ticagrelor 90 milliGRAM(s) Oral every 12 hours    MEDICATIONS  (PRN):  acetaminophen     Tablet .. 650 milliGRAM(s) Oral every 6 hours PRN Temp greater or equal to 38C (100.4F), Mild Pain (1 - 3)  aluminum hydroxide/magnesium hydroxide/simethicone Suspension 30 milliLiter(s) Oral every 4 hours PRN Dyspepsia  dextrose Oral Gel 15 Gram(s) Oral once PRN Blood Glucose LESS THAN 70 milliGRAM(s)/deciliter  heparin   Injectable 3800 Unit(s) IV Push every 6 hours PRN For aPTT less than 40  melatonin 3 milliGRAM(s) Oral at bedtime PRN Insomnia  morphine  - Injectable 2 milliGRAM(s) IV Push every 4 hours PRN Severe Pain (7 - 10)  nitroglycerin     SubLingual 0.4 milliGRAM(s) SubLingual every 5 minutes PRN Chest Pain      Drug Dosing Weight    Weight (kg): 65.317 (02 Feb 2024 19:31)    FAMILY HISTORY:  FH: heart disease        SOCIAL HISTORY:  Smoker[ ]  Non smoker[ ]  Alcohol [ ]      ADVANCE DIRECTIVES:    CONSTITUTIONAL: No fevers, No chills, No fatigue, No weight gain  EYES: No vision changes   ENT: No congestion, No ear pain, No sore throat.  NECK: No pain, No stiffness  RESPIRATORY: No shortness of breath, No cough, No wheezing, No hemoptysis  CARDIOVASCULAR: No chest pain. No palpitations, No BRO, No orthopnea, No paroxysmal nocturnal dyspnea, No pleuritic pain  GASTROINTESTINAL: No abdominal pain, No nausea, No vomiting, No hematemesis, No diarrhea No constipation. No melena  GENITOURINARY: No dysuria, No frequency, No incontinence, No hematuria  NEUROLOGICAL: No dizziness, No lightheadedness, No syncope, No LOC, No headache, No numbness or weakness  MUSCULOSKELETAL: No Edema, No joint pain, No joint swelling.  PSYCHIATRIC: No anxiety, No depression  DERMATOLOGY: No diaphoresis. No itching, No rashes, No pressure ulcers  HEME/LYMPH: No easy bruising, or bleeding gums    All other review of systems is negative unless indicated above.    Appearance: NAD, no distress  HEENT: Moist Mucous Membranes, Anicteric, PERRL, EOMI  Cardiovascular: Regular rate and rhythm, Normal S1 S2, No JVD, No murmurs  Respiratory: Lungs clear to auscultation. No rales, No rhonchi, No wheezing. No tenderness to palpation  Gastrointestinal:  Soft, Non-tender, + BS  Neurologic: Non-focal, A&Ox3  Skin: Warm and dry, No rashes, No ecchymosis, No cyanosis  Musculoskeletal: No clubbing, No cyanosis, No edema  Vascular: Peripheral pulses palpable 2+ bilaterally  Psychiatry: Mood & affect appropriate      	    		            ICU Vital Signs Last 24 Hrs  T(C): 36.7 (03 Feb 2024 10:00), Max: 36.7 (03 Feb 2024 10:00)  T(F): 98.1 (03 Feb 2024 10:00), Max: 98.1 (03 Feb 2024 10:00)  HR: 94 (03 Feb 2024 10:00) (68 - 96)  BP: 131/70 (03 Feb 2024 10:00) (131/70 - 166/96)  BP(mean): --  ABP: --  ABP(mean): --  RR: 17 (03 Feb 2024 10:00) (16 - 19)  SpO2: 100% (03 Feb 2024 10:00) (99% - 100%)    O2 Parameters below as of 03 Feb 2024 10:00  Patient On (Oxygen Delivery Method): room air                LABS:  CBC Full  -  ( 03 Feb 2024 04:10 )  WBC Count : 15.87 K/uL  RBC Count : 4.70 M/uL  Hemoglobin : 12.5 g/dL  Hematocrit : 37.6 %  Platelet Count - Automated : 229 K/uL  Mean Cell Volume : 80.0 fL  Mean Cell Hemoglobin : 26.6 pg  Mean Cell Hemoglobin Concentration : 33.2 gm/dL  Auto Neutrophil # : x  Auto Lymphocyte # : x  Auto Monocyte # : x  Auto Eosinophil # : x  Auto Basophil # : x  Auto Neutrophil % : x  Auto Lymphocyte % : x  Auto Monocyte % : x  Auto Eosinophil % : x  Auto Basophil % : x    02-03    137  |  102  |  10  ----------------------------<  278<H>  4.1   |  21<L>  |  0.60    Ca    8.7      03 Feb 2024 04:10    TPro  7.1  /  Alb  3.7  /  TBili  0.4  /  DBili  x   /  AST  98<H>  /  ALT  29  /  AlkPhos  100  02-03    CARDIAC MARKERS ( 03 Feb 2024 10:37 )  x     / x     / 1052 U/L / x     / 77.0 ng/mL  CARDIAC MARKERS ( 03 Feb 2024 04:10 )  x     / x     / 1075 U/L / x     / 102.1 ng/mL  CARDIAC MARKERS ( 02 Feb 2024 20:45 )  x     / x     / 375 U/L / x     / 32.6 ng/mL  CARDIAC MARKERS ( 02 Feb 2024 17:37 )  x     / x     / 149 U/L / x     / 9.9 ng/mL      CAPILLARY BLOOD GLUCOSE      POCT Blood Glucose.: 280 mg/dL (03 Feb 2024 12:19)    PT/INR - ( 02 Feb 2024 20:45 )   PT: 10.4 sec;   INR: 0.92 ratio         PTT - ( 03 Feb 2024 04:10 )  PTT:77.0 sec  Urinalysis Basic - ( 03 Feb 2024 04:10 )    Color: x / Appearance: x / SG: x / pH: x  Gluc: 278 mg/dL / Ketone: x  / Bili: x / Urobili: x   Blood: x / Protein: x / Nitrite: x   Leuk Esterase: x / RBC: x / WBC x   Sq Epi: x / Non Sq Epi: x / Bacteria: x        I&O's Detail      EKG:    ECHO      RADIOLOGY STUDIES    CXR:     CT SCAN:     ULTRASOUND:     ASSESSMENT      PLAN          Case discussed with Cardiology fellow Reason for CCU Consult:   NSTEMI with rising troponin    HISTORY OF PRESENT ILLNESS:58F with PMHx hypothyroidism, DM2, HLD, remote hx nonbleeding peptic ulcers presenting with chest pain  radiated to bilateral shoulders and back. She went to see dental regarding jaw pain and was told of TMJ. In ED found to have elevated trop. Initial  Troponin 62--157. Pt was loaded with ASA, Brilinta and started on heparin drip. Pt was admitted to tele floor for NSTEMI. Pt continue to uptrending trop 1194-> 1272.  The chest pain is overall much improved but still with some back pain at this time. Pt was transfer to CCU for up trending trop.       Allergies    amoxicillin (Hives)  sulfa drugs (Hives)  Originally Entered as [Unknown] reaction to [sulfur] (Unknown)    Intolerances    Farxiga (Unknown)      PAST MEDICAL & SURGICAL HISTORY:  Diabetes mellitus      HLD (hyperlipidemia)      Hypothyroidism      No significant past surgical history          MEDICATIONS  (STANDING):  aspirin enteric coated 81 milliGRAM(s) Oral daily  atorvastatin 80 milliGRAM(s) Oral at bedtime  dextrose 5%. 1000 milliLiter(s) (100 mL/Hr) IV Continuous <Continuous>  dextrose 5%. 1000 milliLiter(s) (50 mL/Hr) IV Continuous <Continuous>  dextrose 50% Injectable 25 Gram(s) IV Push once  dextrose 50% Injectable 25 Gram(s) IV Push once  dextrose 50% Injectable 12.5 Gram(s) IV Push once  glucagon  Injectable 1 milliGRAM(s) IntraMuscular once  heparin  Infusion.  Unit(s)/Hr (7.5 mL/Hr) IV Continuous <Continuous>  insulin lispro (ADMELOG) corrective regimen sliding scale   SubCutaneous at bedtime  insulin lispro (ADMELOG) corrective regimen sliding scale   SubCutaneous three times a day before meals  levothyroxine 75 MICROGram(s) Oral daily  pantoprazole    Tablet 40 milliGRAM(s) Oral before breakfast  ticagrelor 90 milliGRAM(s) Oral every 12 hours    MEDICATIONS  (PRN):  acetaminophen     Tablet .. 650 milliGRAM(s) Oral every 6 hours PRN Temp greater or equal to 38C (100.4F), Mild Pain (1 - 3)  aluminum hydroxide/magnesium hydroxide/simethicone Suspension 30 milliLiter(s) Oral every 4 hours PRN Dyspepsia  dextrose Oral Gel 15 Gram(s) Oral once PRN Blood Glucose LESS THAN 70 milliGRAM(s)/deciliter  heparin   Injectable 3800 Unit(s) IV Push every 6 hours PRN For aPTT less than 40  melatonin 3 milliGRAM(s) Oral at bedtime PRN Insomnia  morphine  - Injectable 2 milliGRAM(s) IV Push every 4 hours PRN Severe Pain (7 - 10)  nitroglycerin     SubLingual 0.4 milliGRAM(s) SubLingual every 5 minutes PRN Chest Pain      Drug Dosing Weight    Weight (kg): 65.317 (02 Feb 2024 19:31)    FAMILY HISTORY:  FH: heart disease        SOCIAL HISTORY:  Smoker[ ]  Non smoker[ ]  Alcohol [ ]      ADVANCE DIRECTIVES:    CONSTITUTIONAL: No fevers, No chills, No fatigue, No weight gain  EYES: No vision changes   ENT: No congestion, No ear pain, No sore throat.  NECK: No pain, No stiffness  RESPIRATORY: No shortness of breath, No cough, No wheezing, No hemoptysis  CARDIOVASCULAR: No chest pain. No palpitations, No BRO, No orthopnea, No paroxysmal nocturnal dyspnea, No pleuritic pain  GASTROINTESTINAL: No abdominal pain, No nausea, No vomiting, No hematemesis, No diarrhea No constipation. No melena  GENITOURINARY: No dysuria, No frequency, No incontinence, No hematuria  NEUROLOGICAL: No dizziness, No lightheadedness, No syncope, No LOC, No headache, No numbness or weakness  MUSCULOSKELETAL: No Edema, No joint pain, No joint swelling.  PSYCHIATRIC: No anxiety, No depression  DERMATOLOGY: No diaphoresis. No itching, No rashes, No pressure ulcers  HEME/LYMPH: No easy bruising, or bleeding gums    All other review of systems is negative unless indicated above.    Appearance: NAD, no distress  HEENT: Moist Mucous Membranes, Anicteric, PERRL, EOMI  Cardiovascular: Regular rate and rhythm, Normal S1 S2, No JVD, No murmurs  Respiratory: Lungs clear to auscultation. No rales, No rhonchi, No wheezing. No tenderness to palpation  Gastrointestinal:  Soft, Non-tender, + BS  Neurologic: Non-focal, A&Ox3  Skin: Warm and dry, No rashes, No ecchymosis, No cyanosis  Musculoskeletal: No clubbing, No cyanosis, No edema  Vascular: Peripheral pulses palpable 2+ bilaterally  Psychiatry: Mood & affect appropriate      	    		            ICU Vital Signs Last 24 Hrs  T(C): 36.7 (03 Feb 2024 10:00), Max: 36.7 (03 Feb 2024 10:00)  T(F): 98.1 (03 Feb 2024 10:00), Max: 98.1 (03 Feb 2024 10:00)  HR: 94 (03 Feb 2024 10:00) (68 - 96)  BP: 131/70 (03 Feb 2024 10:00) (131/70 - 166/96)  BP(mean): --  ABP: --  ABP(mean): --  RR: 17 (03 Feb 2024 10:00) (16 - 19)  SpO2: 100% (03 Feb 2024 10:00) (99% - 100%)    O2 Parameters below as of 03 Feb 2024 10:00  Patient On (Oxygen Delivery Method): room air                LABS:  CBC Full  -  ( 03 Feb 2024 04:10 )  WBC Count : 15.87 K/uL  RBC Count : 4.70 M/uL  Hemoglobin : 12.5 g/dL  Hematocrit : 37.6 %  Platelet Count - Automated : 229 K/uL  Mean Cell Volume : 80.0 fL  Mean Cell Hemoglobin : 26.6 pg  Mean Cell Hemoglobin Concentration : 33.2 gm/dL  Auto Neutrophil # : x  Auto Lymphocyte # : x  Auto Monocyte # : x  Auto Eosinophil # : x  Auto Basophil # : x  Auto Neutrophil % : x  Auto Lymphocyte % : x  Auto Monocyte % : x  Auto Eosinophil % : x  Auto Basophil % : x    02-03    137  |  102  |  10  ----------------------------<  278<H>  4.1   |  21<L>  |  0.60    Ca    8.7      03 Feb 2024 04:10    TPro  7.1  /  Alb  3.7  /  TBili  0.4  /  DBili  x   /  AST  98<H>  /  ALT  29  /  AlkPhos  100  02-03    CARDIAC MARKERS ( 03 Feb 2024 10:37 )  x     / x     / 1052 U/L / x     / 77.0 ng/mL  CARDIAC MARKERS ( 03 Feb 2024 04:10 )  x     / x     / 1075 U/L / x     / 102.1 ng/mL  CARDIAC MARKERS ( 02 Feb 2024 20:45 )  x     / x     / 375 U/L / x     / 32.6 ng/mL  CARDIAC MARKERS ( 02 Feb 2024 17:37 )  x     / x     / 149 U/L / x     / 9.9 ng/mL      CAPILLARY BLOOD GLUCOSE      POCT Blood Glucose.: 280 mg/dL (03 Feb 2024 12:19)    PT/INR - ( 02 Feb 2024 20:45 )   PT: 10.4 sec;   INR: 0.92 ratio         PTT - ( 03 Feb 2024 04:10 )  PTT:77.0 sec  Urinalysis Basic - ( 03 Feb 2024 04:10 )    Color: x / Appearance: x / SG: x / pH: x  Gluc: 278 mg/dL / Ketone: x  / Bili: x / Urobili: x   Blood: x / Protein: x / Nitrite: x   Leuk Esterase: x / RBC: x / WBC x   Sq Epi: x / Non Sq Epi: x / Bacteria: x        I&O's Detail      EKG:    ECHO:       RADIOLOGY STUDIES    CXR: < from: Xray Chest 2 Views PA/Lat (02.02.24 @ 18:22) >    Clear lungs. No pleural effusions or pneumothorax.  Cardiac and mediastinal silhouettes within normal limits.  Trachea midline.  Unremarkable osseous structures.    < end of copied text >        ASSESSMENT: 58F with PMHx hypothyroidism, DM2, HLD, remote hx nonbleeding peptic ulcers presenting with chest pain  radiated to bilateral shoulders and back found to have elevate trop. Admitted for NSTEMI. Patient was loaded with ASA, Brilinta and heparin drip.pt was transfer to CCU for up trending trop and mild back pain.      Plan      Neuro  AxOx3      Resp        Card  # NSTEMI   Patient  presented with elevated CE and  with chest pain.   Load with  mg now, Brilinta 180mg mg PO and start heparin gtt as per ACS protocol  Serial EKG PRN chest pain to assess for ST changes  Continuous cardiac monitoring to monitor for arrhythmias  Admission labs include serial cardiac enzymes, risk factor profile to include TSH, HGBA1c, Lipid profile, CMP, Mag, Phos, CBC, pBNP. Trend cardiac enzymes  Aspirin 81 PO daily, Brilinta 90mg bid, Lipitor 80 mg PO daily  Plan for  cardiac cath on Monday . If persistent chest pain with EKG changes, will plan for emergent cath   Introduce beta blockers as tolerated  Hold ACE for now in anticipation of possible cath.   Low fat DASH diet  ECHO: 2D ECHO to evaluate LV function and wall motion abnormalities      GI  History of gastric ulcer  Start PPI  Dash/CCB diet      Renal indices WNL      Endo    # DM  Has h/o diabetes and is on oral hypoglycemic agents  HbA1c 8.3  Hold oral hypoglycemic agents  Monitor blood sugars ac and hs  ADMELOG insulin sliding scale  Low carbohydrate diet  Diabetes education  Nutrition consult if needed      #HLD (hyperlipidemia).   c/w  atorvastatin 80mg.      # Hypothyroidism.    c/w levothyroxine 75mcg qd      ID  # leukocytosis   Elevated wbc without fever  likely 2/2 inflammatory process 2/2 MI      DVT PPx  heparin drip Reason for CCU Consult:   NSTEMI with rising troponin    HISTORY OF PRESENT ILLNESS:58F with PMHx hypothyroidism, DM2, HLD, remote hx nonbleeding peptic ulcers presenting with chest pain  radiated to bilateral shoulders and back. She went to see dental regarding jaw pain and was told of TMJ. In ED found to have elevated trop. Initial  Troponin 62--157. Pt was loaded with ASA, Brilinta and started on heparin drip. Pt was admitted to tele floor for NSTEMI. Pt continue to uptrending trop 1194-> 1272.  The chest pain is overall much improved but still with some back pain at this time. Pt is transferring to the CCU for closer observation and possible left heart cath.    Allergies    amoxicillin (Hives)  sulfa drugs (Hives)  Originally Entered as [Unknown] reaction to [sulfur] (Unknown)    Intolerances  Farxiga (Unknown)      PAST MEDICAL & SURGICAL HISTORY:  Diabetes mellitus  HLD (hyperlipidemia)  Hypothyroidism    No significant past surgical history    MEDICATIONS  (STANDING):  aspirin enteric coated 81 milliGRAM(s) Oral daily  atorvastatin 80 milliGRAM(s) Oral at bedtime  dextrose 5%. 1000 milliLiter(s) (100 mL/Hr) IV Continuous <Continuous>  dextrose 5%. 1000 milliLiter(s) (50 mL/Hr) IV Continuous <Continuous>  dextrose 50% Injectable 25 Gram(s) IV Push once  dextrose 50% Injectable 25 Gram(s) IV Push once  dextrose 50% Injectable 12.5 Gram(s) IV Push once  glucagon  Injectable 1 milliGRAM(s) IntraMuscular once  heparin  Infusion.  Unit(s)/Hr (7.5 mL/Hr) IV Continuous <Continuous>  insulin lispro (ADMELOG) corrective regimen sliding scale   SubCutaneous at bedtime  insulin lispro (ADMELOG) corrective regimen sliding scale   SubCutaneous three times a day before meals  levothyroxine 75 MICROGram(s) Oral daily  pantoprazole    Tablet 40 milliGRAM(s) Oral before breakfast  ticagrelor 90 milliGRAM(s) Oral every 12 hours    MEDICATIONS  (PRN):  acetaminophen     Tablet .. 650 milliGRAM(s) Oral every 6 hours PRN Temp greater or equal to 38C (100.4F), Mild Pain (1 - 3)  aluminum hydroxide/magnesium hydroxide/simethicone Suspension 30 milliLiter(s) Oral every 4 hours PRN Dyspepsia  dextrose Oral Gel 15 Gram(s) Oral once PRN Blood Glucose LESS THAN 70 milliGRAM(s)/deciliter  heparin   Injectable 3800 Unit(s) IV Push every 6 hours PRN For aPTT less than 40  melatonin 3 milliGRAM(s) Oral at bedtime PRN Insomnia  morphine  - Injectable 2 milliGRAM(s) IV Push every 4 hours PRN Severe Pain (7 - 10)  nitroglycerin     SubLingual 0.4 milliGRAM(s) SubLingual every 5 minutes PRN Chest Pain      Drug Dosing Weight    Weight (kg): 65.317 (02 Feb 2024 19:31)    FAMILY HISTORY:  FH: heart disease    REVIEW OF SYSTEMS:  CONSTITUTIONAL: No fevers, No chills, No fatigue, No weight gain  EYES: No vision changes   ENT: No congestion, No ear pain, No sore throat.  NECK: No pain, No stiffness  RESPIRATORY: No shortness of breath, No cough, No wheezing, No hemoptysis  CARDIOVASCULAR: endorses intermittent chest pain  GASTROINTESTINAL: No abdominal pain, No nausea, No vomiting, No hematemesis, No diarrhea No constipation. No melena  GENITOURINARY: No dysuria, No frequency, No incontinence, No hematuria  NEUROLOGICAL: No dizziness, No lightheadedness, No syncope, No LOC, No headache, No numbness or weakness  MUSCULOSKELETAL: No Edema, No joint pain, No joint swelling.  PSYCHIATRIC: No anxiety, No depression  DERMATOLOGY: No diaphoresis. No itching, No rashes, No pressure ulcers  HEME/LYMPH: No easy bruising, or bleeding gums    All other review of systems is negative unless indicated above.    Appearance: NAD, no distress  HEENT: Moist Mucous Membranes, Anicteric, PERRL, EOMI  Cardiovascular: Regular rate and rhythm, Normal S1 S2, No JVD, No murmurs  Respiratory: Lungs clear to auscultation. No rales, No rhonchi, No wheezing. No tenderness to palpation  Gastrointestinal:  Soft, Non-tender, + BS  Neurologic: Non-focal, A&Ox3  Skin: Warm and dry, No rashes, No ecchymosis, No cyanosis  Musculoskeletal: No clubbing, No cyanosis, No edema  Vascular: Peripheral pulses palpable 2+ bilaterally  Psychiatry: Mood & affect appropriate    ICU Vital Signs Last 24 Hrs  T(C): 36.7 (03 Feb 2024 10:00), Max: 36.7 (03 Feb 2024 10:00)  T(F): 98.1 (03 Feb 2024 10:00), Max: 98.1 (03 Feb 2024 10:00)  HR: 94 (03 Feb 2024 10:00) (68 - 96)  BP: 131/70 (03 Feb 2024 10:00) (131/70 - 166/96)  RR: 17 (03 Feb 2024 10:00) (16 - 19)  SpO2: 100% (03 Feb 2024 10:00) (99% - 100%)    O2 Parameters below as of 03 Feb 2024 10:00  Patient On (Oxygen Delivery Method): room air      LABS:  CBC Full  -  ( 03 Feb 2024 04:10 )  WBC Count : 15.87 K/uL  RBC Count : 4.70 M/uL  Hemoglobin : 12.5 g/dL  Hematocrit : 37.6 %  Platelet Count - Automated : 229 K/uL  Mean Cell Volume : 80.0 fL  Mean Cell Hemoglobin : 26.6 pg  Mean Cell Hemoglobin Concentration : 33.2 gm/dL  Auto Neutrophil # : x  Auto Lymphocyte # : x  Auto Monocyte # : x  Auto Eosinophil # : x  Auto Basophil # : x  Auto Neutrophil % : x  Auto Lymphocyte % : x  Auto Monocyte % : x  Auto Eosinophil % : x  Auto Basophil % : x    02-03    137  |  102  |  10  ----------------------------<  278<H>  4.1   |  21<L>  |  0.60    Ca    8.7      03 Feb 2024 04:10    TPro  7.1  /  Alb  3.7  /  TBili  0.4  /  DBili  x   /  AST  98<H>  /  ALT  29  /  AlkPhos  100  02-03    CARDIAC MARKERS ( 03 Feb 2024 10:37 )  x     / x     / 1052 U/L / x     / 77.0 ng/mL  CARDIAC MARKERS ( 03 Feb 2024 04:10 )  x     / x     / 1075 U/L / x     / 102.1 ng/mL  CARDIAC MARKERS ( 02 Feb 2024 20:45 )  x     / x     / 375 U/L / x     / 32.6 ng/mL  CARDIAC MARKERS ( 02 Feb 2024 17:37 )  x     / x     / 149 U/L / x     / 9.9 ng/mL      CAPILLARY BLOOD GLUCOSE      POCT Blood Glucose.: 280 mg/dL (03 Feb 2024 12:19)    PT/INR - ( 02 Feb 2024 20:45 )   PT: 10.4 sec;   INR: 0.92 ratio         PTT - ( 03 Feb 2024 04:10 )  PTT:77.0 sec  Urinalysis Basic - ( 03 Feb 2024 04:10 )    Color: x / Appearance: x / SG: x / pH: x  Gluc: 278 mg/dL / Ketone: x  / Bili: x / Urobili: x   Blood: x / Protein: x / Nitrite: x   Leuk Esterase: x / RBC: x / WBC x   Sq Epi: x / Non Sq Epi: x / Bacteria: x          CXR: < from: Xray Chest 2 Views PA/Lat (02.02.24 @ 18:22) >    Clear lungs. No pleural effusions or pneumothorax.  Cardiac and mediastinal silhouettes within normal limits.  Trachea midline.  Unremarkable osseous structures.      ASSESSMENT: 58F with PMHx hypothyroidism, DM2, HLD, remote hx nonbleeding peptic ulcers presenting with chest pain  radiated to bilateral shoulders and back found to have elevate trop. Admitted for NSTEMI. Patient was loaded with ASA, Brilinta and heparin drip.pt was transfer to CCU for up trending trop and mild back pain.    PLAN:    Neuro  AxOx3    RESP:  On room air    Card  # NSTEMI   Patient  presented with elevated CE and  with chest pain.   Load with  mg now, Brilinta 180mg mg PO and start heparin gtt as per ACS protocol  Serial EKG PRN chest pain to assess for ST changes  Continuous cardiac monitoring to monitor for arrhythmias  Admission labs include serial cardiac enzymes, risk factor profile to include TSH, HGBA1c, Lipid profile, CMP, Mag, Phos, CBC, pBNP. Trend cardiac enzymes  Aspirin 81 PO daily, Brilinta 90mg bid, Lipitor 80 mg PO daily  Plan for  cardiac cath on Monday . If persistent chest pain with EKG changes, will plan for emergent cath   Introduce beta blockers as tolerated  Hold ACE for now in anticipation of possible cath.   Low fat DASH diet  ECHO: 2D ECHO to evaluate LV function and wall motion abnormalities      GI  History of gastric ulcer  Start PPI  Dash/CCB diet      Renal indices WNL      Endo    # DM  Has h/o diabetes and is on oral hypoglycemic agents  HbA1c 8.3  Hold oral hypoglycemic agents  Monitor blood sugars ac and hs  ADMELOG insulin sliding scale  Low carbohydrate diet  Diabetes education  Nutrition consult if needed      #HLD (hyperlipidemia).   c/w  atorvastatin 80mg.      # Hypothyroidism.    c/w levothyroxine 75mcg qd      ID  # leukocytosis   Elevated wbc without fever  likely 2/2 inflammatory process 2/2 MI      DVT PPx  heparin drip

## 2024-02-03 NOTE — PATIENT PROFILE ADULT - NSPRESCRUSEDDRG_GEN_A_NUR
Impression: Type 2 diabetes mellitus w/o complication: Q14.4. Plan: Diabetes type II: no background retinopathy, no signs of neovascularization noted. Discussed ocular and systemic benefits of blood sugar control. No

## 2024-02-03 NOTE — PATIENT PROFILE ADULT - FALL HARM RISK - HARM RISK INTERVENTIONS

## 2024-02-04 LAB
ALBUMIN SERPL ELPH-MCNC: 3.6 G/DL — SIGNIFICANT CHANGE UP (ref 3.3–5)
ALP SERPL-CCNC: 106 U/L — SIGNIFICANT CHANGE UP (ref 40–120)
ALT FLD-CCNC: 26 U/L — SIGNIFICANT CHANGE UP (ref 4–33)
ANION GAP SERPL CALC-SCNC: 13 MMOL/L — SIGNIFICANT CHANGE UP (ref 7–14)
APTT BLD: 85.8 SEC — HIGH (ref 24.5–35.6)
AST SERPL-CCNC: 55 U/L — HIGH (ref 4–32)
BASE EXCESS BLDV CALC-SCNC: -1 MMOL/L — SIGNIFICANT CHANGE UP (ref -2–3)
BILIRUB SERPL-MCNC: 0.6 MG/DL — SIGNIFICANT CHANGE UP (ref 0.2–1.2)
BLOOD GAS VENOUS COMPREHENSIVE RESULT: SIGNIFICANT CHANGE UP
BUN SERPL-MCNC: 7 MG/DL — SIGNIFICANT CHANGE UP (ref 7–23)
CALCIUM SERPL-MCNC: 9 MG/DL — SIGNIFICANT CHANGE UP (ref 8.4–10.5)
CHLORIDE BLDV-SCNC: 101 MMOL/L — SIGNIFICANT CHANGE UP (ref 96–108)
CHLORIDE SERPL-SCNC: 101 MMOL/L — SIGNIFICANT CHANGE UP (ref 98–107)
CHOLEST SERPL-MCNC: 226 MG/DL — HIGH
CHOLEST SERPL-MCNC: 235 MG/DL — HIGH
CK MB BLD-MCNC: 2.8 % — HIGH (ref 0–2.5)
CK MB CFR SERPL CALC: 11 NG/ML — HIGH
CK SERPL-CCNC: 387 U/L — HIGH (ref 25–170)
CO2 BLDV-SCNC: 24.7 MMOL/L — SIGNIFICANT CHANGE UP (ref 22–26)
CO2 SERPL-SCNC: 20 MMOL/L — LOW (ref 22–31)
CREAT SERPL-MCNC: 0.56 MG/DL — SIGNIFICANT CHANGE UP (ref 0.5–1.3)
EGFR: 106 ML/MIN/1.73M2 — SIGNIFICANT CHANGE UP
GAS PNL BLDV: 129 MMOL/L — LOW (ref 136–145)
GAS PNL BLDV: SIGNIFICANT CHANGE UP
GLUCOSE BLDC GLUCOMTR-MCNC: 207 MG/DL — HIGH (ref 70–99)
GLUCOSE BLDC GLUCOMTR-MCNC: 211 MG/DL — HIGH (ref 70–99)
GLUCOSE BLDC GLUCOMTR-MCNC: 221 MG/DL — HIGH (ref 70–99)
GLUCOSE BLDC GLUCOMTR-MCNC: 286 MG/DL — HIGH (ref 70–99)
GLUCOSE BLDV-MCNC: 258 MG/DL — HIGH (ref 70–99)
GLUCOSE SERPL-MCNC: 225 MG/DL — HIGH (ref 70–99)
HCO3 BLDV-SCNC: 24 MMOL/L — SIGNIFICANT CHANGE UP (ref 22–29)
HCT VFR BLD CALC: 37.6 % — SIGNIFICANT CHANGE UP (ref 34.5–45)
HCT VFR BLDA CALC: 53 % — HIGH (ref 34.5–46.5)
HDLC SERPL-MCNC: 61 MG/DL — SIGNIFICANT CHANGE UP
HDLC SERPL-MCNC: 62 MG/DL — SIGNIFICANT CHANGE UP
HGB BLD CALC-MCNC: 17.8 G/DL — HIGH (ref 11.7–16.1)
HGB BLD-MCNC: 12.3 G/DL — SIGNIFICANT CHANGE UP (ref 11.5–15.5)
LACTATE BLDV-MCNC: 2.3 MMOL/L — HIGH (ref 0.5–2)
LIPID PNL WITH DIRECT LDL SERPL: 104 MG/DL — HIGH
LIPID PNL WITH DIRECT LDL SERPL: 113 MG/DL — HIGH
MAGNESIUM SERPL-MCNC: 2 MG/DL — SIGNIFICANT CHANGE UP (ref 1.6–2.6)
MCHC RBC-ENTMCNC: 26.6 PG — LOW (ref 27–34)
MCHC RBC-ENTMCNC: 32.7 GM/DL — SIGNIFICANT CHANGE UP (ref 32–36)
MCV RBC AUTO: 81.2 FL — SIGNIFICANT CHANGE UP (ref 80–100)
NON HDL CHOLESTEROL: 164 MG/DL — HIGH
NON HDL CHOLESTEROL: 174 MG/DL — HIGH
NRBC # BLD: 0 /100 WBCS — SIGNIFICANT CHANGE UP (ref 0–0)
NRBC # FLD: 0 K/UL — SIGNIFICANT CHANGE UP (ref 0–0)
PCO2 BLDV: 38 MMHG — LOW (ref 39–52)
PH BLDV: 7.4 — SIGNIFICANT CHANGE UP (ref 7.32–7.43)
PHOSPHATE SERPL-MCNC: 2.7 MG/DL — SIGNIFICANT CHANGE UP (ref 2.5–4.5)
PLATELET # BLD AUTO: 231 K/UL — SIGNIFICANT CHANGE UP (ref 150–400)
PO2 BLDV: 57 MMHG — HIGH (ref 25–45)
POTASSIUM BLDV-SCNC: 3.9 MMOL/L — SIGNIFICANT CHANGE UP (ref 3.5–5.1)
POTASSIUM SERPL-MCNC: 4 MMOL/L — SIGNIFICANT CHANGE UP (ref 3.5–5.3)
POTASSIUM SERPL-SCNC: 4 MMOL/L — SIGNIFICANT CHANGE UP (ref 3.5–5.3)
PROT SERPL-MCNC: 7.2 G/DL — SIGNIFICANT CHANGE UP (ref 6–8.3)
RBC # BLD: 4.63 M/UL — SIGNIFICANT CHANGE UP (ref 3.8–5.2)
RBC # FLD: 13.3 % — SIGNIFICANT CHANGE UP (ref 10.3–14.5)
SAO2 % BLDV: 91 % — HIGH (ref 67–88)
SODIUM SERPL-SCNC: 134 MMOL/L — LOW (ref 135–145)
TRIGL SERPL-MCNC: 302 MG/DL — HIGH
TRIGL SERPL-MCNC: 303 MG/DL — HIGH
TROPONIN T, HIGH SENSITIVITY RESULT: 614 NG/L — CRITICAL HIGH
WBC # BLD: 18.25 K/UL — HIGH (ref 3.8–10.5)
WBC # FLD AUTO: 18.25 K/UL — HIGH (ref 3.8–10.5)

## 2024-02-04 PROCEDURE — 99291 CRITICAL CARE FIRST HOUR: CPT

## 2024-02-04 RX ORDER — METOPROLOL TARTRATE 50 MG
25 TABLET ORAL
Refills: 0 | Status: DISCONTINUED | OUTPATIENT
Start: 2024-02-04 | End: 2024-02-06

## 2024-02-04 RX ORDER — METOPROLOL TARTRATE 50 MG
25 TABLET ORAL
Refills: 0 | Status: DISCONTINUED | OUTPATIENT
Start: 2024-02-04 | End: 2024-02-04

## 2024-02-04 RX ORDER — ATORVASTATIN CALCIUM 80 MG/1
80 TABLET, FILM COATED ORAL AT BEDTIME
Refills: 0 | Status: DISCONTINUED | OUTPATIENT
Start: 2024-02-04 | End: 2024-02-05

## 2024-02-04 RX ADMIN — HEPARIN SODIUM 8 UNIT(S)/HR: 5000 INJECTION INTRAVENOUS; SUBCUTANEOUS at 07:17

## 2024-02-04 RX ADMIN — Medication 650 MILLIGRAM(S): at 20:10

## 2024-02-04 RX ADMIN — TICAGRELOR 90 MILLIGRAM(S): 90 TABLET ORAL at 17:10

## 2024-02-04 RX ADMIN — Medication 2: at 17:10

## 2024-02-04 RX ADMIN — TICAGRELOR 90 MILLIGRAM(S): 90 TABLET ORAL at 06:28

## 2024-02-04 RX ADMIN — Medication 2: at 07:25

## 2024-02-04 RX ADMIN — ATORVASTATIN CALCIUM 80 MILLIGRAM(S): 80 TABLET, FILM COATED ORAL at 20:10

## 2024-02-04 RX ADMIN — Medication 6 MICROGRAM(S)/MIN: at 07:17

## 2024-02-04 RX ADMIN — Medication 25 MILLIGRAM(S): at 17:10

## 2024-02-04 RX ADMIN — Medication 81 MILLIGRAM(S): at 11:29

## 2024-02-04 RX ADMIN — Medication 650 MILLIGRAM(S): at 07:00

## 2024-02-04 RX ADMIN — Medication 12.5 MILLIGRAM(S): at 06:28

## 2024-02-04 RX ADMIN — PANTOPRAZOLE SODIUM 40 MILLIGRAM(S): 20 TABLET, DELAYED RELEASE ORAL at 06:28

## 2024-02-04 RX ADMIN — Medication 650 MILLIGRAM(S): at 06:28

## 2024-02-04 RX ADMIN — Medication 75 MICROGRAM(S): at 05:10

## 2024-02-04 NOTE — PROGRESS NOTE ADULT - SUBJECTIVE AND OBJECTIVE BOX
Patient is a 58y old  Female who presents with a chief complaint of chest pain (04 Feb 2024 14:47)      INTERVAL HPI/OVERNIGHT EVENTS:  T(C): 36.6 (02-04-24 @ 19:00), Max: 37.2 (02-04-24 @ 08:00)  HR: 99 (02-04-24 @ 21:00) (90 - 117)  BP: 116/67 (02-04-24 @ 21:00) (83/56 - 145/75)  RR: 20 (02-04-24 @ 21:00) (12 - 26)  SpO2: 100% (02-04-24 @ 21:00) (95% - 100%)  Wt(kg): --  I&O's Summary    03 Feb 2024 07:01  -  04 Feb 2024 07:00  --------------------------------------------------------  IN: 1204 mL / OUT: 1450 mL / NET: -246 mL    04 Feb 2024 07:01  -  04 Feb 2024 21:47  --------------------------------------------------------  IN: 0 mL / OUT: 1200 mL / NET: -1200 mL        PAST MEDICAL & SURGICAL HISTORY:  Diabetes mellitus      HLD (hyperlipidemia)      Hypothyroidism      No significant past surgical history          SOCIAL HISTORY  Alcohol:  Tobacco:  Illicit substance use:    FAMILY HISTORY:    REVIEW OF SYSTEMS:  CONSTITUTIONAL: No fever, weight loss, or fatigue  EYES: No eye pain, visual disturbances, or discharge  ENMT:  No difficulty hearing, tinnitus, vertigo; No sinus or throat pain  NECK: No pain or stiffness  RESPIRATORY: No cough, wheezing, chills or hemoptysis; No shortness of breath  CARDIOVASCULAR: No chest pain, palpitations, dizziness, or leg swelling  GASTROINTESTINAL: No abdominal or epigastric pain. No nausea, vomiting, or hematemesis; No diarrhea or constipation. No melena or hematochezia.  GENITOURINARY: No dysuria, frequency, hematuria, or incontinence  NEUROLOGICAL: No headaches, memory loss, loss of strength, numbness, or tremors  SKIN: No itching, burning, rashes, or lesions   LYMPH NODES: No enlarged glands  ENDOCRINE: No heat or cold intolerance; No hair loss  MUSCULOSKELETAL: No joint pain or swelling; No muscle, back, or extremity pain  PSYCHIATRIC: No depression, anxiety, mood swings, or difficulty sleeping  HEME/LYMPH: No easy bruising, or bleeding gums  ALLERY AND IMMUNOLOGIC: No hives or eczema    RADIOLOGY & ADDITIONAL TESTS:    Imaging Personally Reviewed:  [ ] YES  [ ] NO    Consultant(s) Notes Reviewed:  [ ] YES  [ ] NO    PHYSICAL EXAM:  GENERAL: NAD, well-groomed, well-developed  HEAD:  Atraumatic, Normocephalic  EYES: EOMI, PERRLA, conjunctiva and sclera clear  ENMT: No tonsillar erythema, exudates, or enlargement; Moist mucous membranes, Good dentition, No lesions  NECK: Supple, No JVD, Normal thyroid  NERVOUS SYSTEM:  Alert & Oriented X3, Good concentration; Motor Strength 5/5 B/L upper and lower extremities; DTRs 2+ intact and symmetric  CHEST/LUNG: Clear to percussion bilaterally; No rales, rhonchi, wheezing, or rubs  HEART: Regular rate and rhythm; No murmurs, rubs, or gallops  ABDOMEN: Soft, Nontender, Nondistended; Bowel sounds present  EXTREMITIES:  2+ Peripheral Pulses, No clubbing, cyanosis, or edema  LYMPH: No lymphadenopathy noted  SKIN: No rashes or lesions    LABS:                        12.3   18.25 )-----------( 231      ( 04 Feb 2024 06:15 )             37.6     02-04    134<L>  |  101  |  7   ----------------------------<  225<H>  4.0   |  20<L>  |  0.56    Ca    9.0      04 Feb 2024 07:10  Phos  2.7     02-04  Mg     2.00     02-04    TPro  7.2  /  Alb  3.6  /  TBili  0.6  /  DBili  x   /  AST  55<H>  /  ALT  26  /  AlkPhos  106  02-04    PTT - ( 04 Feb 2024 06:15 )  PTT:85.8 sec  Urinalysis Basic - ( 04 Feb 2024 07:10 )    Color: x / Appearance: x / SG: x / pH: x  Gluc: 225 mg/dL / Ketone: x  / Bili: x / Urobili: x   Blood: x / Protein: x / Nitrite: x   Leuk Esterase: x / RBC: x / WBC x   Sq Epi: x / Non Sq Epi: x / Bacteria: x      CAPILLARY BLOOD GLUCOSE      POCT Blood Glucose.: 221 mg/dL (04 Feb 2024 20:09)  POCT Blood Glucose.: 207 mg/dL (04 Feb 2024 16:47)  POCT Blood Glucose.: 286 mg/dL (04 Feb 2024 11:23)  POCT Blood Glucose.: 211 mg/dL (04 Feb 2024 07:22)        Urinalysis Basic - ( 04 Feb 2024 07:10 )    Color: x / Appearance: x / SG: x / pH: x  Gluc: 225 mg/dL / Ketone: x  / Bili: x / Urobili: x   Blood: x / Protein: x / Nitrite: x   Leuk Esterase: x / RBC: x / WBC x   Sq Epi: x / Non Sq Epi: x / Bacteria: x        MEDICATIONS  (STANDING):  aspirin enteric coated 81 milliGRAM(s) Oral daily  atorvastatin 80 milliGRAM(s) Oral at bedtime  dextrose 50% Injectable 25 Gram(s) IV Push once  dextrose 50% Injectable 12.5 Gram(s) IV Push once  insulin lispro (ADMELOG) corrective regimen sliding scale   SubCutaneous at bedtime  insulin lispro (ADMELOG) corrective regimen sliding scale   SubCutaneous three times a day before meals  levothyroxine 75 MICROGram(s) Oral daily  metoprolol tartrate 25 milliGRAM(s) Oral two times a day  pantoprazole    Tablet 40 milliGRAM(s) Oral before breakfast  ticagrelor 90 milliGRAM(s) Oral every 12 hours    MEDICATIONS  (PRN):  acetaminophen     Tablet .. 650 milliGRAM(s) Oral every 4 hours PRN Moderate Pain (4 - 6), Severe Pain (7 - 10)  aluminum hydroxide/magnesium hydroxide/simethicone Suspension 30 milliLiter(s) Oral every 4 hours PRN Dyspepsia  melatonin 3 milliGRAM(s) Oral at bedtime PRN Insomnia      Care Discussed with Consultants/Other Providers [ ] YES  [ ] NO

## 2024-02-04 NOTE — PROGRESS NOTE ADULT - ASSESSMENT
Patient is a 58F with PMHx hypothyroidism, DM2, HLD, remote hx nonbleeding peptic ulcers presents with NSTEMI.    Neuro:  Alert and oriented  Headache overnight likely from nitro gtt  tramadol given   RESP:  On room air    Card  # NSTEMI   -EKG with ST depressions in V3 and V4,interventionalist notified, no need for urgent LHC  -CE/troponin are downtrending  -nitro gtt initiated for chest pain, patient now chest pain free  -ACS protocol initiated  -heparin gtt, brilinta loaded, ASA loaded  -continue DAPT   -continue pravachol  -plan for LH Monday  -ECHO done revealing EF of 45-50% and Hypokinesis of the distal anterolateral and distal inferolateral walls.  -risk factor profile labs lipid profile pending  TSH normal  HGBA1c is 8.3    GI  History of gastric ulcer  Start PPI  Dash/CCB diet      Renal indices WNL      Endo    # DM  Has h/o diabetes and is on oral hypoglycemic agents  HbA1c 8.3  Hold oral hypoglycemic agents  Monitor blood sugars ac and hs  ADMELOG insulin sliding scale  Low carbohydrate diet  Diabetes education  Nutrition consult if needed      #HLD (hyperlipidemia).   c/w  atorvastatin 80mg.      # Hypothyroidism.    c/w levothyroxine 75mcg qd      ID  # leukocytosis   Elevated wbc without fever  likely 2/2 inflammatory process 2/2 MI      DVT PPx  heparin drip.

## 2024-02-04 NOTE — PROGRESS NOTE ADULT - ASSESSMENT
A/.P    58F with PMHx hypothyroidism, DM2, HLD, remote hx nonbleeding peptic ulcers presenting with chest pain today. Admitted for NSTEMI    #NSTEMI (non-ST elevation myocardial infarction).   -s/p PCI to om  -hd stable  -continue DAP  -add toprol xl 25 qd if bp can tolerate  -echo noted, mild lv dysfxn  -eventual low dose arb vs entresto before d/c    #Type 2 diabetes mellitus with hyperglycemia, without long-term current use of insulin.   -Hold metformin  -med f/u    #HLD (hyperlipidemia).   -cont statin    #Hypothyroidism.   -levothyroxine 75mcg qd.    #History of gastric ulcer.   -ppi    d/w ccu team       35 minutes spent on total encounter; more than 50% of the visit was spent counseling and/or coordinating care by the attending physician.

## 2024-02-04 NOTE — CHART NOTE - NSCHARTNOTEFT_GEN_A_CORE
Right radial band removed. There is no bleeding and no hematoma. The site is tender to touch. Hemostasis achieved    Right femoral sheath removed 4pm. There is no bleeding, no hematoma. Hemostasis achieved. Post procedure orders to be followed.

## 2024-02-04 NOTE — PROGRESS NOTE ADULT - ASSESSMENT
58F with PMHx hypothyroidism, DM2, HLD, remote hx nonbleeding peptic ulcers presenting with chest pain today. Admitted for NSTEMI    #NSTEMI (non-ST elevation myocardial infarction).   s/p cardiac cath with stent placement   started on brilinta   - toprol xl 25 qd if bp can tolerate  -echo noted, mild lv dysfxn    #Type 2 diabetes mellitus   -Hold metformin  c/w insulin / FSBS     #HLD (hyperlipidemia).   -cont statin    #Hypothyroidism.   -levothyroxine 75mcg qd.    #History of gastric ulcer.   -ppi    cardio and CCU team following

## 2024-02-04 NOTE — PROGRESS NOTE ADULT - SUBJECTIVE AND OBJECTIVE BOX
CARDIOLOGY FOLLOW UP NOTE - DR. ARAIZA    Patient Name: PAN ALANIS    Date of Service: 24 @ 14:47    in cath lab    Subjective:    cv: denies chest pain, dyspnea, palpitations, dizziness  pulmonary: denies cough  GI: denies abdominal pain, nausea, vomiting  vascular/legs: no edema   skin: no rash  ROS: otherwise negative   overnight events:      PHYSICAL EXAM:  T(C): 37.1 (24 @ 14:30), Max: 37.2 (24 @ 08:00)  HR: 109 (24 @ 14:30) (82 - 117)  BP: 141/79 (24 @ 14:30) (83/56 - 145/76)  RR: 15 (24 @ 14:30) (14 - 24)  SpO2: 100% (24 @ 14:30) (95% - 100%)  Wt(kg): --  I&O's Summary    2024 07:01  -  2024 07:00  --------------------------------------------------------  IN: 1204 mL / OUT: 1450 mL / NET: -246 mL      Daily     Daily Weight in k.7 (2024 06:00)    Appearance: Normal	  Cardiovascular: Normal S1 S2,RRR, No JVD, No murmurs  Respiratory: Lungs clear to auscultation	  Gastrointestinal:  Soft, Non-tender, + BS	  Extremities: Normal range of motion, No clubbing, cyanosis or edema      Home Medications:  levothyroxine 75 mcg (0.075 mg) oral tablet: 1 tab(s) orally once a day (2024 21:09)  MetFORMIN (Eqv-Glucophage XR) 500 mg oral tablet, extended release: 2 tab(s) orally once a day (2024 21:09)  pravastatin 40 mg oral tablet: 1 tab(s) orally once a day (2024 21:09)      MEDICATIONS  (STANDING):  aspirin enteric coated 81 milliGRAM(s) Oral daily  atorvastatin 80 milliGRAM(s) Oral at bedtime  dextrose 50% Injectable 25 Gram(s) IV Push once  dextrose 50% Injectable 25 Gram(s) IV Push once  dextrose 50% Injectable 12.5 Gram(s) IV Push once  heparin  Infusion 900 Unit(s)/Hr (8 mL/Hr) IV Continuous <Continuous>  insulin lispro (ADMELOG) corrective regimen sliding scale   SubCutaneous three times a day before meals  insulin lispro (ADMELOG) corrective regimen sliding scale   SubCutaneous at bedtime  levothyroxine 75 MICROGram(s) Oral daily  pantoprazole    Tablet 40 milliGRAM(s) Oral before breakfast  ticagrelor 90 milliGRAM(s) Oral every 12 hours      TELEMETRY: 	    ECG:  	  RADIOLOGY:   DIAGNOSTIC TESTING:  [ ] Echocardiogram:  [ ] Catheterization:  [ ] Stress Test:    OTHER: 	    LABS:	 	    CARDIAC MARKERS:        Troponin T, High Sensitivity Result: 614 ng/L ( @ 07:10)  Troponin T, High Sensitivity Result: 641 ng/L ( @ 21:40)  Troponin T, High Sensitivity Result: 1245 ng/L ( @ 10:37)  Troponin T, High Sensitivity Result: 1272 ng/L ( @ 10:37)  Troponin T, High Sensitivity Result: 1194 ng/L ( @ 04:10)  Troponin T, High Sensitivity Result: 157 ng/L ( @ 20:45)  Troponin T, High Sensitivity Result: 62 ng/L ( @ 17:37)                                12.3   18.25 )-----------( 231      ( 2024 06:15 )             37.6         134<L>  |  101  |  7   ----------------------------<  225<H>  4.0   |  20<L>  |  0.56    Ca    9.0      2024 07:10  Phos  2.7       Mg     2.00         TPro  7.2  /  Alb  3.6  /  TBili  0.6  /  DBili  x   /  AST  55<H>  /  ALT  26  /  AlkPhos  106      proBNP:   PT/INR - ( 2024 20:45 )   PT: 10.4 sec;   INR: 0.92 ratio         PTT - ( 2024 06:15 )  PTT:85.8 sec  Lipid Profile:   HgA1c:     Creatinine: 0.56 mg/dL (24 @ 07:10)  Creatinine: 0.61 mg/dL (24 @ 21:40)  Creatinine: 0.60 mg/dL (24 @ 17:30)  Creatinine: 0.60 mg/dL (24 @ 04:10)  Creatinine: 0.70 mg/dL (24 @ 17:37)

## 2024-02-04 NOTE — PROGRESS NOTE ADULT - SUBJECTIVE AND OBJECTIVE BOX
FOLLOW UP:  chest pain/NSTEMI  SUBJECTIVE/OBSERVATIONS:  Patient seen and examined. She complained of headache overnight. She received tramadol. Patient reports her chest pain has improved.  INTERVAL EVENTS:  Troponin is downtrending  nitro gtt is decreased     TELE EVENTS:   Normal sinus rhythm  Vital Signs Last 24 Hrs  T(C): 36.8 (04 Feb 2024 04:00), Max: 36.8 (04 Feb 2024 04:00)  T(F): 98.3 (04 Feb 2024 04:00), Max: 98.3 (04 Feb 2024 04:00)  HR: 104 (04 Feb 2024 06:30) (82 - 117)  BP: 105/58 (04 Feb 2024 06:30) (102/61 - 145/76)  BP(mean): 69 (04 Feb 2024 06:30) (69 - 94)  RR: 21 (04 Feb 2024 06:30) (14 - 23)  SpO2: 96% (04 Feb 2024 06:30) (95% - 100%)    Parameters below as of 04 Feb 2024 06:00  Patient On (Oxygen Delivery Method): room air      I&O's Summary    03 Feb 2024 07:01  -  04 Feb 2024 07:00  --------------------------------------------------------  IN: 1204 mL / OUT: 1450 mL / NET: -246 mL        MEDICATIONS:  aspirin enteric coated 81 milliGRAM(s) Oral daily  heparin  Infusion 900 Unit(s)/Hr IV Continuous <Continuous>  metoprolol tartrate 12.5 milliGRAM(s) Oral two times a day  nitroglycerin  Infusion 20 MICROgram(s)/Min IV Continuous <Continuous>  ticagrelor 90 milliGRAM(s) Oral every 12 hours  acetaminophen     Tablet .. 650 milliGRAM(s) Oral every 4 hours PRN  melatonin 3 milliGRAM(s) Oral at bedtime PRN  aluminum hydroxide/magnesium hydroxide/simethicone Suspension 30 milliLiter(s) Oral every 4 hours PRN  pantoprazole    Tablet 40 milliGRAM(s) Oral before breakfast  dextrose 50% Injectable 25 Gram(s) IV Push once  dextrose 50% Injectable 25 Gram(s) IV Push once  dextrose 50% Injectable 12.5 Gram(s) IV Push once  dextrose Oral Gel 15 Gram(s) Oral once PRN  glucagon  Injectable 1 milliGRAM(s) IntraMuscular once  insulin lispro (ADMELOG) corrective regimen sliding scale   SubCutaneous three times a day before meals  insulin lispro (ADMELOG) corrective regimen sliding scale   SubCutaneous at bedtime  levothyroxine 75 MICROGram(s) Oral daily  pravastatin 40 milliGRAM(s) Oral at bedtime    dextrose 5%. 1000 milliLiter(s) IV Continuous <Continuous>  dextrose 5%. 1000 milliLiter(s) IV Continuous <Continuous>      REVIEW OF SYSTEMS:    CONSTITUTIONAL: No weakness, fevers or chills  EYES/ENT: No visual changes;  No vertigo or throat pain   NECK: No pain or stiffness  RESPIRATORY: No cough, wheezing, hemoptysis; No shortness of breath  CARDIOVASCULAR: endorses intermittent chest pain radiating to the jaw  GASTROINTESTINAL: No abdominal or epigastric pain. No nausea, vomiting, or hematemesis; No diarrhea or constipation. No melena or hematochezia.  GENITOURINARY: No dysuria, frequency or hematuria  NEUROLOGICAL: No numbness or weakness  SKIN: No itching, rashes      PHYSICAL EXAM:  General: NAD  Cardiovascular: Normal S1 S2, No JVD, No murmurs, No edema  Respiratory: Lungs clear to auscultation	  Gastrointestinal:  Soft, Non-tender, + BS	  Skin: warm and dry, No rashes, No ecchymoses, No cyanosis	  Extremities: Normal range of motion, No clubbing, cyanosis or edema  Vascular: Peripheral pulses palpable 2+ bilaterally    LABS:	 	    CBC Full  -  ( 03 Feb 2024 17:30 )  WBC Count : 12.20 K/uL  Hemoglobin : 12.7 g/dL  Hematocrit : 38.8 %  Platelet Count - Automated : 222 K/uL  Mean Cell Volume : 79.7 fL  Mean Cell Hemoglobin : 26.1 pg  Mean Cell Hemoglobin Concentration : 32.7 gm/dL  Auto Neutrophil # : x  Auto Lymphocyte # : x  Auto Monocyte # : x  Auto Eosinophil # : x  Auto Basophil # : x  Auto Neutrophil % : x  Auto Lymphocyte % : x  Auto Monocyte % : x  Auto Eosinophil % : x  Auto Basophil % : x    02-03    138  |  104  |  7   ----------------------------<  212<H>  4.2   |  22  |  0.61  02-03    137  |  103  |  7   ----------------------------<  246<H>  3.8   |  21<L>  |  0.60    Ca    9.1      03 Feb 2024 21:40  Ca    8.9      03 Feb 2024 17:30  Phos  3.4     02-03  Phos  2.4     02-03  Mg     2.30     02-03  Mg     2.10     02-03    TPro  7.2  /  Alb  3.7  /  TBili  0.6  /  DBili  x   /  AST  75<H>  /  ALT  29  /  AlkPhos  105  02-03  TPro  7.5  /  Alb  3.9  /  TBili  0.6  /  DBili  x   /  AST  88<H>  /  ALT  32  /  AlkPhos  111  02-03    HgA1c: 8.3  TSH: 2.17    Cardiac Markers:  DATE/TIME: 2/2 17:37  TROPONIN: 62  CPK: 149  CKMB: 9.9     DATE/TIME: 2/3 4am  Troponin: 1194  CPK: 1075  CKMB: 102.1    DATE/TIME: 2/4  Troponin: 641  CPK: 587  CKMB: 25.4        < from: TTE W or WO Ultrasound Enhancing Agent (02.03.24 @ 14:52) >  TRANSTHORACIC ECHOCARDIOGRAM REPORT  ________________________________________________________________________________                                      _______       Pt. Name:       PAN ALANIS Study Date:    2/3/2024  MRN:    [id number]                  YOB: 1965  Accession #:    [id number]                  Age:           58 years  Account#:       [id number]                   Gender:        F  Heart Rate:                                Height:        62.00 in (157.48 cm)  Rhythm:                                    Weight:        147.69 lb (66.99 kg)  Blood Pressure: 138/72 mmHg                BSA/BMI:       1.68 m² / 27.01 kg/m²  ________________________________________________________________________________________  Referring Physician:    [id number] Tacos Wallace  Interpreting Physician: Tacos Moore M.D.  Primary Sonographer:    Jenni Zaldivar [facility]    CPT:                ECHO TTE WITH CON COMP W DOPP - .m;DEFINITY ECHO      CONTRAST PER ML - .m  Indication(s):      Chest pain, unspecified - R07.9  Procedure:          Transthoracic echocardiogram with 2-D, M-mode and complete                      spectral and color flow Doppler.  Ordering Location:  [facility]  Admission Status:   Inpatient  Contrast Injection: Verbal consent was obtained for injection of Ultrasonic                      Enhancing Agent following a discussion of risks and                      benefits.                      Endocardial visualizationenhanced with Definity Ultrasound                      enhancing agent.  UEA Reaction:       Patient had no adverse reaction after injection of                      Ultrasound Enhancing Agent.  Study Information:  Image quality for this study is fair.    _______________________________________________________________________________________     CONCLUSIONS:      1. Left ventricular systolic function is mildly decreased with an ejection fraction visually estimated at 45 to 50%. There are regional wall motion abnormalities present. Hypokinesis of the distal anterolateral and distal inferolateral walls.   2. Normal right ventricular cavity size and probably normal systolic function.   3. Structurally normal mitral valve with normal leaflet excursion. There is trace mitral regurgitation.   4. No prior echocardiogram is available for comparison.    < end of copied text >

## 2024-02-05 ENCOUNTER — TRANSCRIPTION ENCOUNTER (OUTPATIENT)
Age: 59
End: 2024-02-05

## 2024-02-05 LAB
ALBUMIN SERPL ELPH-MCNC: 3.7 G/DL — SIGNIFICANT CHANGE UP (ref 3.3–5)
ALP SERPL-CCNC: 116 U/L — SIGNIFICANT CHANGE UP (ref 40–120)
ALT FLD-CCNC: 33 U/L — SIGNIFICANT CHANGE UP (ref 4–33)
ANION GAP SERPL CALC-SCNC: 13 MMOL/L — SIGNIFICANT CHANGE UP (ref 7–14)
AST SERPL-CCNC: 57 U/L — HIGH (ref 4–32)
BASE EXCESS BLDV CALC-SCNC: -0.4 MMOL/L — SIGNIFICANT CHANGE UP (ref -2–3)
BILIRUB SERPL-MCNC: 1.1 MG/DL — SIGNIFICANT CHANGE UP (ref 0.2–1.2)
BLOOD GAS VENOUS COMPREHENSIVE RESULT: SIGNIFICANT CHANGE UP
BUN SERPL-MCNC: 9 MG/DL — SIGNIFICANT CHANGE UP (ref 7–23)
CALCIUM SERPL-MCNC: 9 MG/DL — SIGNIFICANT CHANGE UP (ref 8.4–10.5)
CHLORIDE BLDV-SCNC: 103 MMOL/L — SIGNIFICANT CHANGE UP (ref 96–108)
CHLORIDE SERPL-SCNC: 100 MMOL/L — SIGNIFICANT CHANGE UP (ref 98–107)
CK SERPL-CCNC: 309 U/L — HIGH (ref 25–170)
CO2 BLDV-SCNC: 25.3 MMOL/L — SIGNIFICANT CHANGE UP (ref 22–26)
CO2 SERPL-SCNC: 21 MMOL/L — LOW (ref 22–31)
CREAT SERPL-MCNC: 0.62 MG/DL — SIGNIFICANT CHANGE UP (ref 0.5–1.3)
EGFR: 104 ML/MIN/1.73M2 — SIGNIFICANT CHANGE UP
GAS PNL BLDV: 134 MMOL/L — LOW (ref 136–145)
GAS PNL BLDV: SIGNIFICANT CHANGE UP
GLUCOSE BLDC GLUCOMTR-MCNC: 158 MG/DL — HIGH (ref 70–99)
GLUCOSE BLDC GLUCOMTR-MCNC: 177 MG/DL — HIGH (ref 70–99)
GLUCOSE BLDC GLUCOMTR-MCNC: 251 MG/DL — HIGH (ref 70–99)
GLUCOSE BLDC GLUCOMTR-MCNC: 263 MG/DL — HIGH (ref 70–99)
GLUCOSE BLDV-MCNC: 201 MG/DL — HIGH (ref 70–99)
GLUCOSE SERPL-MCNC: 197 MG/DL — HIGH (ref 70–99)
HCO3 BLDV-SCNC: 24 MMOL/L — SIGNIFICANT CHANGE UP (ref 22–29)
HCT VFR BLD CALC: 39.6 % — SIGNIFICANT CHANGE UP (ref 34.5–45)
HCT VFR BLDA CALC: 40 % — SIGNIFICANT CHANGE UP (ref 34.5–46.5)
HGB BLD CALC-MCNC: 13.2 G/DL — SIGNIFICANT CHANGE UP (ref 11.7–16.1)
HGB BLD-MCNC: 13.1 G/DL — SIGNIFICANT CHANGE UP (ref 11.5–15.5)
LACTATE BLDV-MCNC: 1.2 MMOL/L — SIGNIFICANT CHANGE UP (ref 0.5–2)
MAGNESIUM SERPL-MCNC: 2.2 MG/DL — SIGNIFICANT CHANGE UP (ref 1.6–2.6)
MCHC RBC-ENTMCNC: 26.6 PG — LOW (ref 27–34)
MCHC RBC-ENTMCNC: 33.1 GM/DL — SIGNIFICANT CHANGE UP (ref 32–36)
MCV RBC AUTO: 80.5 FL — SIGNIFICANT CHANGE UP (ref 80–100)
NRBC # BLD: 0 /100 WBCS — SIGNIFICANT CHANGE UP (ref 0–0)
NRBC # FLD: 0 K/UL — SIGNIFICANT CHANGE UP (ref 0–0)
PCO2 BLDV: 38 MMHG — LOW (ref 39–52)
PH BLDV: 7.41 — SIGNIFICANT CHANGE UP (ref 7.32–7.43)
PHOSPHATE SERPL-MCNC: 3.3 MG/DL — SIGNIFICANT CHANGE UP (ref 2.5–4.5)
PLATELET # BLD AUTO: 209 K/UL — SIGNIFICANT CHANGE UP (ref 150–400)
PO2 BLDV: 57 MMHG — HIGH (ref 25–45)
POTASSIUM BLDV-SCNC: 4 MMOL/L — SIGNIFICANT CHANGE UP (ref 3.5–5.1)
POTASSIUM SERPL-MCNC: 3.9 MMOL/L — SIGNIFICANT CHANGE UP (ref 3.5–5.3)
POTASSIUM SERPL-SCNC: 3.9 MMOL/L — SIGNIFICANT CHANGE UP (ref 3.5–5.3)
PROT SERPL-MCNC: 7.6 G/DL — SIGNIFICANT CHANGE UP (ref 6–8.3)
RBC # BLD: 4.92 M/UL — SIGNIFICANT CHANGE UP (ref 3.8–5.2)
RBC # FLD: 13.4 % — SIGNIFICANT CHANGE UP (ref 10.3–14.5)
SAO2 % BLDV: 90.2 % — HIGH (ref 67–88)
SODIUM SERPL-SCNC: 134 MMOL/L — LOW (ref 135–145)
TROPONIN T, HIGH SENSITIVITY RESULT: 906 NG/L — CRITICAL HIGH
WBC # BLD: 14.09 K/UL — HIGH (ref 3.8–10.5)
WBC # FLD AUTO: 14.09 K/UL — HIGH (ref 3.8–10.5)

## 2024-02-05 PROCEDURE — 99291 CRITICAL CARE FIRST HOUR: CPT

## 2024-02-05 RX ORDER — CLOPIDOGREL BISULFATE 75 MG/1
600 TABLET, FILM COATED ORAL ONCE
Refills: 0 | Status: COMPLETED | OUTPATIENT
Start: 2024-02-06 | End: 2024-02-06

## 2024-02-05 RX ORDER — TICAGRELOR 90 MG/1
90 TABLET ORAL ONCE
Refills: 0 | Status: COMPLETED | OUTPATIENT
Start: 2024-02-05 | End: 2024-02-05

## 2024-02-05 RX ORDER — CLOPIDOGREL BISULFATE 75 MG/1
75 TABLET, FILM COATED ORAL DAILY
Refills: 0 | Status: DISCONTINUED | OUTPATIENT
Start: 2024-02-07 | End: 2024-02-06

## 2024-02-05 RX ORDER — ATORVASTATIN CALCIUM 80 MG/1
40 TABLET, FILM COATED ORAL AT BEDTIME
Refills: 0 | Status: DISCONTINUED | OUTPATIENT
Start: 2024-02-05 | End: 2024-02-06

## 2024-02-05 RX ADMIN — TICAGRELOR 90 MILLIGRAM(S): 90 TABLET ORAL at 06:21

## 2024-02-05 RX ADMIN — Medication 650 MILLIGRAM(S): at 08:39

## 2024-02-05 RX ADMIN — Medication 650 MILLIGRAM(S): at 20:53

## 2024-02-05 RX ADMIN — Medication 25 MILLIGRAM(S): at 06:22

## 2024-02-05 RX ADMIN — Medication 1: at 23:22

## 2024-02-05 RX ADMIN — PANTOPRAZOLE SODIUM 40 MILLIGRAM(S): 20 TABLET, DELAYED RELEASE ORAL at 06:20

## 2024-02-05 RX ADMIN — Medication 3: at 11:36

## 2024-02-05 RX ADMIN — TICAGRELOR 90 MILLIGRAM(S): 90 TABLET ORAL at 16:43

## 2024-02-05 RX ADMIN — Medication 650 MILLIGRAM(S): at 08:09

## 2024-02-05 RX ADMIN — Medication 650 MILLIGRAM(S): at 21:40

## 2024-02-05 RX ADMIN — Medication 1: at 16:42

## 2024-02-05 RX ADMIN — Medication 75 MICROGRAM(S): at 06:21

## 2024-02-05 RX ADMIN — Medication 81 MILLIGRAM(S): at 11:37

## 2024-02-05 RX ADMIN — Medication 1: at 08:11

## 2024-02-05 RX ADMIN — ATORVASTATIN CALCIUM 40 MILLIGRAM(S): 80 TABLET, FILM COATED ORAL at 23:22

## 2024-02-05 RX ADMIN — Medication 25 MILLIGRAM(S): at 17:17

## 2024-02-05 NOTE — CHART NOTE - NSCHARTNOTEFT_GEN_A_CORE
continue with the afternoon dose of Brilinta today. Due to insurance not covering Brilinta, Will continue with Plavix loading dose 600 mg tomorrow on 2/6 and then from 2/7 will continue with Plavix 75 mg daily.

## 2024-02-05 NOTE — PROGRESS NOTE ADULT - SUBJECTIVE AND OBJECTIVE BOX
CARDIOLOGY FOLLOW UP - Dr. Wallace  DATE OF SERVICE: 2/5/24    CC no cp or sob       REVIEW OF SYSTEMS:  CONSTITUTIONAL: No fever, weight loss, or fatigue  RESPIRATORY: No cough, wheezing, chills or hemoptysis; No Shortness of Breath  CARDIOVASCULAR: No chest pain, palpitations, passing out, dizziness, or leg swelling  GASTROINTESTINAL: No abdominal or epigastric pain. No nausea, vomiting, or hematemesis; No diarrhea or constipation. No melena or hematochezia.  VASCULAR: No edema     PHYSICAL EXAM:  T(C): 37 (02-05-24 @ 08:00), Max: 37.1 (02-04-24 @ 14:30)  HR: 88 (02-05-24 @ 10:00) (88 - 109)  BP: 107/70 (02-05-24 @ 10:00) (97/61 - 145/75)  RR: 18 (02-05-24 @ 10:00) (12 - 26)  SpO2: 99% (02-05-24 @ 10:00) (98% - 100%)  Wt(kg): --  I&O's Summary    04 Feb 2024 07:01  -  05 Feb 2024 07:00  --------------------------------------------------------  IN: 0 mL / OUT: 1450 mL / NET: -1450 mL    05 Feb 2024 07:01  -  05 Feb 2024 10:25  --------------------------------------------------------  IN: 0 mL / OUT: 450 mL / NET: -450 mL        Appearance: Normal	  Cardiovascular: Normal S1 S2,RRR, No JVD, No murmurs  Respiratory: Lungs clear to auscultation	  Gastrointestinal:  Soft, Non-tender, + BS	  Extremities: Normal range of motion, No clubbing, cyanosis or edema      Home Medications:  levothyroxine 75 mcg (0.075 mg) oral tablet: 1 tab(s) orally once a day (02 Feb 2024 21:09)  MetFORMIN (Eqv-Glucophage XR) 500 mg oral tablet, extended release: 2 tab(s) orally once a day (02 Feb 2024 21:09)  pravastatin 40 mg oral tablet: 1 tab(s) orally once a day (02 Feb 2024 21:09)      MEDICATIONS  (STANDING):  aspirin enteric coated 81 milliGRAM(s) Oral daily  atorvastatin 80 milliGRAM(s) Oral at bedtime  dextrose 50% Injectable 25 Gram(s) IV Push once  dextrose 50% Injectable 12.5 Gram(s) IV Push once  insulin lispro (ADMELOG) corrective regimen sliding scale   SubCutaneous three times a day before meals  insulin lispro (ADMELOG) corrective regimen sliding scale   SubCutaneous at bedtime  levothyroxine 75 MICROGram(s) Oral daily  metoprolol tartrate 25 milliGRAM(s) Oral two times a day  pantoprazole    Tablet 40 milliGRAM(s) Oral before breakfast  ticagrelor 90 milliGRAM(s) Oral every 12 hours      TELEMETRY: nsr	    ECG:  	  RADIOLOGY:   DIAGNOSTIC TESTING:  [ ] Echocardiogram:  [ ]  Catheterization:  [ ] Stress Test:    OTHER: 	    LABS:	 	    Creatine Kinase, Serum: 309 U/L [25 - 170] (02-05 @ 06:35)  Troponin T, High Sensitivity Result: 906 ng/L (02-05 @ 06:35)  CKMB Units: 11.0 ng/mL (02-04 @ 07:10)  Creatine Kinase, Serum: 387 U/L [25 - 170] (02-04 @ 07:10)  Troponin T, High Sensitivity Result: 614 ng/L (02-04 @ 07:10)  CKMB Units: 25.4 ng/mL (02-03 @ 21:40)  Creatine Kinase, Serum: 587 U/L [25 - 170] (02-03 @ 21:40)  Troponin T, High Sensitivity Result: 641 ng/L (02-03 @ 21:40)  Creatine Kinase, Serum: 1052 U/L [25 - 170] (02-03 @ 10:37)  CKMB Units: 77.0 ng/mL (02-03 @ 10:37)  Troponin T, High Sensitivity Result: 1245 ng/L (02-03 @ 10:37)  Troponin T, High Sensitivity Result: 1272 ng/L (02-03 @ 10:37)  Troponin T, High Sensitivity Result: 1194 ng/L (02-03 @ 04:10)  Creatine Kinase, Serum: 1075 U/L [25 - 170] (02-03 @ 04:10)  CKMB Units: 102.1 ng/mL (02-03 @ 04:10)  Troponin T, High Sensitivity Result: 157 ng/L (02-02 @ 20:45)  Creatine Kinase, Serum: 375 U/L [25 - 170] (02-02 @ 20:45)  CKMB Units: 32.6 ng/mL (02-02 @ 20:45)  Troponin T, High Sensitivity Result: 62 ng/L (02-02 @ 17:37)  Creatine Kinase, Serum: 149 U/L [25 - 170] (02-02 @ 17:37)  CKMB Units: 9.9 ng/mL (02-02 @ 17:37)                          13.1   14.09 )-----------( 209      ( 05 Feb 2024 06:35 )             39.6     02-05    134<L>  |  100  |  9   ----------------------------<  197<H>  3.9   |  21<L>  |  0.62    Ca    9.0      05 Feb 2024 06:35  Phos  3.3     02-05  Mg     2.20     02-05    TPro  7.6  /  Alb  3.7  /  TBili  1.1  /  DBili  x   /  AST  57<H>  /  ALT  33  /  AlkPhos  116  02-05    PTT - ( 04 Feb 2024 06:15 )  PTT:85.8 sec

## 2024-02-05 NOTE — PROGRESS NOTE ADULT - SUBJECTIVE AND OBJECTIVE BOX
Patient is a 58y old  Female who presents with a chief complaint of chest pain (05 Feb 2024 10:51)      INTERVAL HPI/OVERNIGHT EVENTS:  T(C): 36.7 (02-05-24 @ 16:00), Max: 37 (02-05-24 @ 00:00)  HR: 108 (02-05-24 @ 21:00) (87 - 118)  BP: 118/70 (02-05-24 @ 21:00) (97/61 - 128/73)  RR: 15 (02-05-24 @ 21:00) (10 - 20)  SpO2: 97% (02-05-24 @ 18:00) (97% - 100%)  Wt(kg): --  I&O's Summary    04 Feb 2024 07:01  -  05 Feb 2024 07:00  --------------------------------------------------------  IN: 0 mL / OUT: 1450 mL / NET: -1450 mL    05 Feb 2024 07:01  -  05 Feb 2024 23:27  --------------------------------------------------------  IN: 0 mL / OUT: 850 mL / NET: -850 mL        PAST MEDICAL & SURGICAL HISTORY:  Diabetes mellitus      HLD (hyperlipidemia)      Hypothyroidism      No significant past surgical history          SOCIAL HISTORY  Alcohol:  Tobacco:  Illicit substance use:    FAMILY HISTORY:    REVIEW OF SYSTEMS:  CONSTITUTIONAL: No fever, weight loss, or fatigue  EYES: No eye pain, visual disturbances, or discharge  ENMT:  No difficulty hearing, tinnitus, vertigo; No sinus or throat pain  NECK: No pain or stiffness  RESPIRATORY: No cough, wheezing, chills or hemoptysis; No shortness of breath  CARDIOVASCULAR: No chest pain, palpitations, dizziness, or leg swelling  GASTROINTESTINAL: No abdominal or epigastric pain. No nausea, vomiting, or hematemesis; No diarrhea or constipation. No melena or hematochezia.  GENITOURINARY: No dysuria, frequency, hematuria, or incontinence  NEUROLOGICAL: No headaches, memory loss, loss of strength, numbness, or tremors  SKIN: No itching, burning, rashes, or lesions   LYMPH NODES: No enlarged glands  ENDOCRINE: No heat or cold intolerance; No hair loss  MUSCULOSKELETAL: No joint pain or swelling; No muscle, back, or extremity pain  PSYCHIATRIC: No depression, anxiety, mood swings, or difficulty sleeping  HEME/LYMPH: No easy bruising, or bleeding gums  ALLERY AND IMMUNOLOGIC: No hives or eczema    RADIOLOGY & ADDITIONAL TESTS:    Imaging Personally Reviewed:  [ ] YES  [ ] NO    Consultant(s) Notes Reviewed:  [ ] YES  [ ] NO    PHYSICAL EXAM:  GENERAL: NAD, well-groomed, well-developed  HEAD:  Atraumatic, Normocephalic  EYES: EOMI, PERRLA, conjunctiva and sclera clear  ENMT: No tonsillar erythema, exudates, or enlargement; Moist mucous membranes, Good dentition, No lesions  NECK: Supple, No JVD, Normal thyroid  NERVOUS SYSTEM:  Alert & Oriented X3, Good concentration; Motor Strength 5/5 B/L upper and lower extremities; DTRs 2+ intact and symmetric  CHEST/LUNG: Clear to percussion bilaterally; No rales, rhonchi, wheezing, or rubs  HEART: Regular rate and rhythm; No murmurs, rubs, or gallops  ABDOMEN: Soft, Nontender, Nondistended; Bowel sounds present  EXTREMITIES:  2+ Peripheral Pulses, No clubbing, cyanosis, or edema  LYMPH: No lymphadenopathy noted  SKIN: No rashes or lesions    LABS:                        13.1   14.09 )-----------( 209      ( 05 Feb 2024 06:35 )             39.6     02-05    134<L>  |  100  |  9   ----------------------------<  197<H>  3.9   |  21<L>  |  0.62    Ca    9.0      05 Feb 2024 06:35  Phos  3.3     02-05  Mg     2.20     02-05    TPro  7.6  /  Alb  3.7  /  TBili  1.1  /  DBili  x   /  AST  57<H>  /  ALT  33  /  AlkPhos  116  02-05    PTT - ( 04 Feb 2024 06:15 )  PTT:85.8 sec  Urinalysis Basic - ( 05 Feb 2024 06:35 )    Color: x / Appearance: x / SG: x / pH: x  Gluc: 197 mg/dL / Ketone: x  / Bili: x / Urobili: x   Blood: x / Protein: x / Nitrite: x   Leuk Esterase: x / RBC: x / WBC x   Sq Epi: x / Non Sq Epi: x / Bacteria: x      CAPILLARY BLOOD GLUCOSE      POCT Blood Glucose.: 251 mg/dL (05 Feb 2024 23:20)  POCT Blood Glucose.: 158 mg/dL (05 Feb 2024 16:11)  POCT Blood Glucose.: 263 mg/dL (05 Feb 2024 11:33)  POCT Blood Glucose.: 177 mg/dL (05 Feb 2024 07:48)        Urinalysis Basic - ( 05 Feb 2024 06:35 )    Color: x / Appearance: x / SG: x / pH: x  Gluc: 197 mg/dL / Ketone: x  / Bili: x / Urobili: x   Blood: x / Protein: x / Nitrite: x   Leuk Esterase: x / RBC: x / WBC x   Sq Epi: x / Non Sq Epi: x / Bacteria: x        MEDICATIONS  (STANDING):  aspirin enteric coated 81 milliGRAM(s) Oral daily  atorvastatin 40 milliGRAM(s) Oral at bedtime  dextrose 50% Injectable 25 Gram(s) IV Push once  dextrose 50% Injectable 12.5 Gram(s) IV Push once  insulin lispro (ADMELOG) corrective regimen sliding scale   SubCutaneous at bedtime  insulin lispro (ADMELOG) corrective regimen sliding scale   SubCutaneous three times a day before meals  levothyroxine 75 MICROGram(s) Oral daily  metoprolol tartrate 25 milliGRAM(s) Oral two times a day  pantoprazole    Tablet 40 milliGRAM(s) Oral before breakfast    MEDICATIONS  (PRN):  acetaminophen     Tablet .. 650 milliGRAM(s) Oral every 4 hours PRN Moderate Pain (4 - 6), Severe Pain (7 - 10)  aluminum hydroxide/magnesium hydroxide/simethicone Suspension 30 milliLiter(s) Oral every 4 hours PRN Dyspepsia  melatonin 3 milliGRAM(s) Oral at bedtime PRN Insomnia      Care Discussed with Consultants/Other Providers [ ] YES  [ ] NO

## 2024-02-05 NOTE — PROGRESS NOTE ADULT - ASSESSMENT
A/.P    58F with PMHx hypothyroidism, DM2, HLD, remote hx nonbleeding peptic ulcers presenting with chest pain today. Admitted for NSTEMI    #NSTEMI (non-ST elevation myocardial infarction).   -per provider handoff-- SP LHC on 2/4 -->OM1-99% 2 stents placed  -PLAD 80%-staging for today --pt unsure if to proceed ? patient reports interventionalist cardiologist yesterday stated it could medically be treated   -hd stable  -continue DAP  -toprol xl 25 qd  -echo noted, mild lv dysfxn  -eventual low dose arb vs entresto before d/c    #Type 2 diabetes mellitus with hyperglycemia, without long-term current use of insulin.   -Hold metformin  -med f/u    #HLD (hyperlipidemia).   -cont statin    #Hypothyroidism.   -levothyroxine 75mcg qd.    #History of gastric ulcer.   -ppi

## 2024-02-05 NOTE — PROGRESS NOTE ADULT - ASSESSMENT
Patient is a 58F with PMHx hypothyroidism, DM2, HLD, remote hx nonbleeding peptic ulcers presents with NSTEMI.    Neuro:  AAOx3    Respiratory:   No active issues  Saturating well on RA     Card  # NSTEMI   -EKG with ST depressions in V3 and V4,interventionalist notified, no need for urgent LHC  -CE/troponin are downtrending  -nitro gtt initiated for chest pain, patient now chest pain free  - s/p heparin gtt, ASA and Brilinta loading dose  - s/p LHC on 2/4 with 99% OM lesion s/p DANN   - will require staged procedure today for stenting of proximal LAD   - TSH normal  - HbA1c is 8.3  -continue DAPT   -continue pravachol  -ECHO done revealing EF of 45-50% and Hypokinesis of the distal anterolateral and distal inferolateral walls.  -risk factor profile labs lipid profile pending      GI  History of gastric ulcer  c/w PPI   Dash/CCB diet      No active issues   BUN and SCr WNL       Endo    # DM  Has h/o diabetes on oral hypoglycemic agents at home   HbA1c 8.3  Hold oral hypoglycemic agents  Monitor blood sugars ac and hs  ADMELOG insulin sliding scale  Low carbohydrate diet  Diabetes education  Nutrition consult if needed      #HLD (hyperlipidemia).   c/w  atorvastatin 80mg.      # Hypothyroidism.    c/w levothyroxine 75mcg qd      ID  # leukocytosis   Elevated wbc without fever  WBC trending down to 14.09 today from 18.25  likely reactive 2/2 acute NSTEMI     DVT PPx  heparin drip.   Patient is a 58F with PMHx hypothyroidism, DM2, HLD, remote hx nonbleeding peptic ulcers presents with NSTEMI.    Neuro:  AAOx3  Headache - c/w PRN Tylenol     Respiratory:   No active issues  Saturating well on RA     Card  # NSTEMI   -EKG with ST depressions in V3 and V4,interventionalist notified, no need for urgent LHC  -CE/troponin are downtrending  -nitro gtt initiated for chest pain, patient now chest pain free  - s/p heparin gtt, ASA and Brilinta loading dose  - s/p LHC on 2/4 with 99% OM lesion s/p DANN   - will require staged procedure today for stenting of proximal LAD   - TSH normal  - HbA1c is 8.3  -continue DAPT   -continue Lipitor   -ECHO done revealing EF of 45-50% and Hypokinesis of the distal anterolateral and distal inferolateral walls.  -risk factor profile labs lipid profile pending      GI  History of gastric ulcer  c/w PPI   Dash/CCB diet      No active issues   BUN and SCr WNL       Endo    # DM  Has h/o diabetes on oral hypoglycemic agents at home   HbA1c 8.3  Hold oral hypoglycemic agents  Monitor blood sugars ac and hs  ADMELOG insulin sliding scale  Low carbohydrate diet  Diabetes education  Nutrition consult if needed      #HLD (hyperlipidemia).   Total cholesterol 226, TAGs 302,  and HDL 62  c/w  atorvastatin 80mg.      # Hypothyroidism.    c/w levothyroxine 75mcg qd      ID  # leukocytosis   Elevated wbc without fever  CXR: neg for consolidations or effusions.   WBC trending down to 14.09 today from 18.25  likely reactive 2/2 acute NSTEMI     DVT PPx  heparin drip.

## 2024-02-05 NOTE — DISCHARGE NOTE PROVIDER - NSDCMRMEDTOKEN_GEN_ALL_CORE_FT
levothyroxine 75 mcg (0.075 mg) oral tablet: 1 tab(s) orally once a day  MetFORMIN (Eqv-Glucophage XR) 500 mg oral tablet, extended release: 2 tab(s) orally once a day  pravastatin 40 mg oral tablet: 1 tab(s) orally once a day  ticagrelor 90 mg oral tablet: 1 tab(s) orally 2 times a day   Adult Aspirin Regimen 81 mg oral delayed release tablet: 1 tab(s) orally once a day  levothyroxine 75 mcg (0.075 mg) oral tablet: 1 tab(s) orally once a day  MetFORMIN (Eqv-Glucophage XR) 500 mg oral tablet, extended release: 2 tab(s) orally once a day  metoprolol succinate 50 mg oral tablet, extended release: 1 tab(s) orally once a day  Plavix 75 mg oral tablet: 1 tab(s) orally once a day  pravastatin 40 mg oral tablet: 1 tab(s) orally once a day

## 2024-02-05 NOTE — DISCHARGE NOTE PROVIDER - CARE PROVIDER_API CALL
Tacos Wallace  Cardiovascular Disease  [address] [facility], [address]  [place] [address]  Phone: [phone]  Fax: [phone]  Follow Up Time:    Tacos Wallace  Cardiovascular Disease  [address] [facility] [address], [address]  [place] [address]  Phone: [phone]  Fax: [phone]  Follow Up Time:     Saji Osborne  Interventional Cardiology  [address] [place] [address], [address]  [place] [address]  Phone: [phone]  Fax: [phone]  Follow Up Time:

## 2024-02-05 NOTE — DISCHARGE NOTE PROVIDER - HOSPITAL COURSE
58F with PMHx hypothyroidism, DM2, HLD, remote hx nonbleeding peptic ulcers presenting with chest pain today. The patient denies prior hx CAD, stents, MI, CHF. This morning she went to see dental regarding jaw pain and was told of TMJ. Today she took ibuprofen 800mg for this. One hour later she began having substernal chest tightness around 2PM. This pain radiated to bilateral shoulders and back. She has never had this pain before. She denies fevers, chills, cough, SOB, palpitations, LH, dizziness, abdominal pain, vomiting, diarrhea. Notes some heartburn as well. She has a remote hx of peptic ulcers decades ago but denies melena, hematochezia. At bedside, the chest pain is overall much improved but still with some back pain at this time. ACS protocol started, heparin gtt, ASA and Brilinta loading dose were provided. She received Nitro gtt for refractory chest pain which was later d/lewis. TSH was WNL, HbA1c 8.3% and lipid profile showed Total cholesterol 226, TAGs 302,  and HDL 62. Echo showed  EF of 45-50% and Hypokinesis of the distal anterolateral and distal inferolateral walls. Upon LHC, 99% occlusion noted on OM s/p DANN, and continued with DAPT and Lipitor 80 mg. As insurance does not cover for Brilinta, will start Plavix loading dose on 2/6 and continue with daily Plavix 75 mg. Pt noted to have leukocytosis which trended down, however remained afebrile, and otherwise without symptoms and signs of infection likely reactive 2/2 NSTEMI.       Things to follow:   start Plavix loading dose on 2/6 and continue with daily Plavix 75 mg  c/w Lipitor 80 mg   c/w ISS   will require nutrition consult and close follow up with Endo for adjustment of medication 58F with PMHx hypothyroidism, DM2, HLD, remote hx nonbleeding peptic ulcers presenting with chest pain today. The patient denies prior hx CAD, stents, MI, CHF. This morning she went to see dental regarding jaw pain and was told of TMJ. Today she took ibuprofen 800mg for this. One hour later she began having substernal chest tightness around 2PM. This pain radiated to bilateral shoulders and back. She has never had this pain before. She denies fevers, chills, cough, SOB, palpitations, LH, dizziness, abdominal pain, vomiting, diarrhea. Notes some heartburn as well. She has a remote hx of peptic ulcers decades ago but denies melena, hematochezia. At bedside, the chest pain is overall much improved but still with some back pain at this time. ACS protocol started, heparin gtt, ASA and Brilinta loading dose were provided. She received Nitro gtt for refractory chest pain which was later d/lewis. TSH was WNL, HbA1c 8.3% and lipid profile showed Total cholesterol 226, TAGs 302,  and HDL 62. Echo showed  EF of 45-50% and Hypokinesis of the distal anterolateral and distal inferolateral walls. Upon LHC, 99% occlusion noted on OM s/p DANN, and continued with DAPT and Lipitor 80 mg. As insurance does not cover for Brilinta, will start Plavix loading dose on 2/6 and continue with daily Plavix 75 mg. Pt noted to have leukocytosis which trended down, however remained afebrile, and otherwise without symptoms and signs of infection likely reactive 2/2 NSTEMI.       TO DOs:  - ACS- start Plavix loading dose 600 mg on 2/6 and continue with daily Plavix 75 mg starting 2/7, c/w Lipitor 40 mg  - c/w ISS   - Will require nutrition consult and close follow up with Endo for adjustment of medication 58F with PMHx hypothyroidism, DM2, HLD, remote hx nonbleeding peptic ulcers presenting with chest pain today. The patient denies prior hx CAD, stents, MI, CHF. This morning she went to see dental regarding jaw pain and was told of TMJ. Today she took ibuprofen 800mg for this. One hour later she began having substernal chest tightness around 2PM. This pain radiated to bilateral shoulders and back. She has never had this pain before. She denies fevers, chills, cough, SOB, palpitations, LH, dizziness, abdominal pain, vomiting, diarrhea. Notes some heartburn as well. She has a remote hx of peptic ulcers decades ago but denies melena, hematochezia. At bedside, the chest pain is overall much improved but still with some back pain at this time. ACS protocol started, heparin gtt, ASA and Brilinta loading dose were provided. She received Nitro gtt for refractory chest pain which was later d/lewis. TSH was WNL, HbA1c 8.3% and lipid profile showed Total cholesterol 226, TAGs 302,  and HDL 62. Echo showed  EF of 45-50% and Hypokinesis of the distal anterolateral and distal inferolateral walls. Upon LHC, 99% occlusion noted on OM s/p DANN, and continued with DAPT and Lipitor 80 mg. As insurance does not cover for Brilinta, will start Plavix loading dose on 2/6 and continue with daily Plavix 75 mg. Pt noted to have leukocytosis which trended down, however remained afebrile, and otherwise without symptoms and signs of infection likely reactive 2/2 NSTEMI.  Plavix loading dose 600 mg on 2/6 and continue with daily Plavix 75 mg starting 2/7, c/w Lipitor 40 mg  - c/w ISS.  Will require nutrition consult and close follow up with Endo for adjustment of medication. 58F with PMHx hypothyroidism, DM2, HLD, remote hx nonbleeding peptic ulcers presenting with chest pain today. The patient denies prior hx CAD, stents, MI, CHF. This morning she went to see dental regarding jaw pain and was told of TMJ. Today she took ibuprofen 800mg for this. One hour later she began having substernal chest tightness around 2PM. This pain radiated to bilateral shoulders and back. She has never had this pain before. She denies fevers, chills, cough, SOB, palpitations, LH, dizziness, abdominal pain, vomiting, diarrhea. Notes some heartburn as well. She has a remote hx of peptic ulcers decades ago but denies melena, hematochezia. At bedside, the chest pain is overall much improved but still with some back pain at this time. ACS protocol started, heparin gtt, ASA and Brilinta loading dose were provided. She received Nitro gtt for refractory chest pain which was later d/lewis. TSH was WNL, HbA1c 8.3% and lipid profile showed Total cholesterol 226, TAGs 302,  and HDL 62. Echo showed  EF of 45-50% and Hypokinesis of the distal anterolateral and distal inferolateral walls. Upon LHC, 99% occlusion noted on OM s/p DANN, and continued with DAPT and Lipitor 80 mg. As insurance does not cover for Brilinta, will start Plavix loading dose on 2/6 and continue with daily Plavix 75 mg. Pt noted to have leukocytosis which trended down, however remained afebrile, and otherwise without symptoms and signs of infection likely reactive 2/2 NSTEMI.  Plavix loading dose 600 mg on 2/6 and continue with daily Plavix 75 mg starting 2/7, c/w Lipitor 40 mg  - c/w ISS. Will require nutrition consult and close follow up with Endo for adjustment of medication.    Pt is stable for discharge. Pt has been advised to follow up as outpatient. Case has been discussed with the attending. This is just a summary of the case. For further information please refer to pt. chart document.

## 2024-02-05 NOTE — DISCHARGE NOTE PROVIDER - NSDCCPCAREPLAN_GEN_ALL_CORE_FT
PRINCIPAL DISCHARGE DIAGNOSIS  Diagnosis: NSTEMI (non-ST elevation myocardial infarction)  Assessment and Plan of Treatment: You presented to the emergency department with severe chest pain. A set of laboratory tests and ECG were performed. Based on the abnormalities noted on the blood tests and ECG results and your symptoms, you were provided with some medications inlcuding aspirin, Brilinta and heparin for preparation of heart catheterization. During the catheterization procedure a 99% occlusion of Obtuse marginal was noted for which 2 drug eluting stents were placed. An occlusion of proximal Left anterior descending artery [LAD] was noted as well however did not require stent placement.   You were provided with aspirin, atorvastatin along with Plavix during the hospital course and require to continue the medication at home.   PLEASE continue both Aspirin 81 mg daily, Plavix 75 mg daily and Atorvastatin 80 mg daily as it is highly required in the setting of coronary artery disease with history of stent placement.   Kindly follow up with your Cardiologist Dr -------- as outpatient for further evaluation, management, and adjustment of medication if requried.         SECONDARY DISCHARGE DIAGNOSES  Diagnosis: HLD (hyperlipidemia)  Assessment and Plan of Treatment: You have history of elevated cholesterol levels. Your LDL,total cholesterol and triglycerides were elevated which increases your risk of coronary artery disease especially in the setting of Diabetes. Please continue to take Atorvastatin 80 mg daily and follow up with your PCP and Cardiologist for further management and adjustment of medication if required.   Kindly monitor you diet and decrease your intake of salt, sugar, saturated fat. Please exercise 30-45 minutes per day for 5 days a week as tolerated.    Diagnosis: DM (diabetes mellitus)  Assessment and Plan of Treatment: You have past medical history of diabetes. Your were provided with insulin which was adjusted based on your sugar levels during the hospital course. Your HbA1C which is your average three month sugar was 8.3%.   PLEASE continue your home medication and follow up with your PCP and Endocrinologist as outpatient for further adjsutmend of medication.  Diabetes mellitus especially when uncontrolled and along with elevated cholesterol levels increases the risk of coronary artery disease, heart attacks, strokes.   Diabetes increases the risk of diseases of the eye, nervous system, and kidney diseases as well. Kindly follow up with your PCP and Endocrinologist for referral to opthlamologist [eye doctor] yearly evaluation, podiatry [foot doctor].   Kindly monitor you surgar level at home and monitor your diet and decrease your intake of salt, sugar, saturated fat. Please exercise 30-45 minutes per day for 5 days a week as tolerated.    Diagnosis: Peptic ulcer disease  Assessment and Plan of Treatment: You reported having peptic ulcer disease in the past. You were provided with Pantoprazole during the hospital course. Please continue with pantoprazole daily and follow up with the PCP and gastroenterologist for adjustment of the medication.     PRINCIPAL DISCHARGE DIAGNOSIS  Diagnosis: NSTEMI (non-ST elevation myocardial infarction)  Assessment and Plan of Treatment: You presented to the emergency department with severe chest pain. A set of laboratory tests and ECG were performed. Based on the abnormalities noted on the blood tests and ECG results and your symptoms, you were provided with some medications inlcuding aspirin, Brilinta and heparin for preparation of heart catheterization. During the catheterization procedure a 99% occlusion of Obtuse marginal was noted for which 2 drug eluting stents were placed. An occlusion of proximal Left anterior descending artery [LAD] was noted as well however did not require stent placement.   You were provided with aspirin, atorvastatin along with Plavix during the hospital course and require to continue the medication at home.   PLEASE continue both Aspirin 81 mg daily and Plavix 75 mg daily along with Pravastatin 40 mg daily as it is highly required in the setting of coronary artery disease with history of stent placement.   Kindly follow up with your Cardiologist Dr -------- as outpatient for further evaluation, management, and adjustment of medication if requried.         SECONDARY DISCHARGE DIAGNOSES  Diagnosis: HLD (hyperlipidemia)  Assessment and Plan of Treatment: You have history of elevated cholesterol levels. Your LDL,total cholesterol and triglycerides were elevated which increases your risk of coronary artery disease especially in the setting of Diabetes. You were provided with Atorvastatin 80 mg which the dosage was reduced to 40 mg as you had some leg cramps.   Please continue to take Pravastatin daily and follow up with your PCP and Cardiologist for further management and adjustment of medication if required.   Kindly monitor you diet and decrease your intake of salt, sugar, saturated fat. Please exercise 30-45 minutes per day for 5 days a week as tolerated.    Diagnosis: DM (diabetes mellitus)  Assessment and Plan of Treatment: You have past medical history of diabetes. Your were provided with insulin which was adjusted based on your sugar levels during the hospital course. Your HbA1C which is your average three month sugar was 8.3%.   PLEASE continue your home medication and follow up with your PCP and Endocrinologist as outpatient for further adjsutmend of medication.  Diabetes mellitus especially when uncontrolled and along with elevated cholesterol levels increases the risk of coronary artery disease, heart attacks, strokes.   Diabetes increases the risk of diseases of the eye, nervous system, and kidney diseases as well. Kindly follow up with your PCP and Endocrinologist for referral to opthlamologist [eye doctor] yearly evaluation, podiatry [foot doctor].   Kindly monitor you surgar level at home and monitor your diet and decrease your intake of salt, sugar, saturated fat. Please exercise 30-45 minutes per day for 5 days a week as tolerated.    Diagnosis: Peptic ulcer disease  Assessment and Plan of Treatment: You reported having peptic ulcer disease in the past. You were provided with Pantoprazole during the hospital course. Please continue with pantoprazole daily and follow up with the PCP and gastroenterologist for adjustment of the medication.     PRINCIPAL DISCHARGE DIAGNOSIS  Diagnosis: NSTEMI (non-ST elevation myocardial infarction)  Assessment and Plan of Treatment: You presented to the emergency department with severe chest pain. A set of laboratory tests and ECG were performed. Based on the abnormalities noted on the blood tests and ECG results and your symptoms, you were provided with some medications inlcuding aspirin, Brilinta and heparin for preparation of heart catheterization. During the catheterization procedure a 99% occlusion of Obtuse marginal artery was noted for which a drug eluting stent placed. An occlusion of proximal Left anterior descending artery [LAD] was noted as well however did not require stent placement at this time.   You were provided with aspirin, atorvastatin along with Plavix during the hospital course and require to continue the medication at home.   PLEASE continue both Aspirin 81 mg daily and Plavix 75 mg daily along with Metoprolol succinate [Toprol] 50 mg and Pravastatin 40 mg daily as it is highly required in the setting of coronary artery disease with history of stent placement.   Kindly follow up with your Cardiologist Dr. Wallace as outpatient for further evaluation, management, and adjustment of medication if requried.      SECONDARY DISCHARGE DIAGNOSES  Diagnosis: HLD (hyperlipidemia)  Assessment and Plan of Treatment: You have history of elevated cholesterol levels. Your LDL,total cholesterol and triglycerides were elevated which increases your risk of coronary artery disease especially in the setting of Diabetes. You were provided with Atorvastatin 80 mg which the dosage was reduced to 40 mg as you had some leg cramps.   Please continue to take Pravastatin daily and follow up with your PCP and Cardiologist for further management and adjustment of medication if required.   Kindly monitor you diet and decrease your intake of salt, sugar, saturated fat. Please exercise 30-45 minutes per day for 5 days a week as tolerated.    Diagnosis: DM (diabetes mellitus)  Assessment and Plan of Treatment: You have past medical history of diabetes. Your were provided with insulin which was adjusted based on your sugar levels during the hospital course. Your HbA1C which is your average three month sugar was 8.3%.   PLEASE continue your home medication and follow up with your PCP and Endocrinologist as outpatient for further adjsutmend of medication.  Diabetes mellitus especially when uncontrolled and along with elevated cholesterol levels increases the risk of coronary artery disease, heart attacks, strokes.   Diabetes increases the risk of diseases of the eye, nervous system, and kidney diseases as well. Kindly follow up with your PCP and Endocrinologist for referral to opthlamologist [eye doctor] yearly evaluation, podiatry [foot doctor] referral.   Kindly monitor you surgar level at home and monitor your diet and decrease your intake of salt, sugar, saturated fat. Please exercise 30-45 minutes per day for 5 days a week as tolerated.    Diagnosis: Peptic ulcer disease  Assessment and Plan of Treatment: You reported having peptic ulcer disease in the past. You were provided with Pantoprazole during the hospital course. Please continue with pantoprazole daily and follow up with the PCP and gastroenterologist for adjustment of the medication.     PRINCIPAL DISCHARGE DIAGNOSIS  Diagnosis: NSTEMI (non-ST elevation myocardial infarction)  Assessment and Plan of Treatment: You presented to the emergency department with severe chest pain. A set of laboratory tests and ECG were performed. Based on the abnormalities noted on the blood tests and ECG results and your symptoms, you were provided with some medications inlcuding aspirin, Brilinta and heparin for preparation of heart catheterization. During the catheterization procedure a 99% occlusion of Obtuse marginal artery was noted for which a drug eluting stent placed. An occlusion of proximal Left anterior descending artery [LAD] was noted as well however did not require stent placement at this time.   You were provided with aspirin, atorvastatin along with Plavix during the hospital course and require to continue the medication at home.   PLEASE continue both Aspirin 81 mg daily and Plavix 75 mg daily along with Metoprolol succinate [Toprol] 50 mg and Pravastatin 40 mg daily as it is highly required in the setting of coronary artery disease with history of stent placement.   Kindly follow up with your Cardiologist Dr. Iftikhar VAIL as outpatient for further evaluation, management, and adjustment of medication if requried.      SECONDARY DISCHARGE DIAGNOSES  Diagnosis: HLD (hyperlipidemia)  Assessment and Plan of Treatment: You have history of elevated cholesterol levels. Your LDL,total cholesterol and triglycerides were elevated which increases your risk of coronary artery disease especially in the setting of Diabetes. You were provided with Atorvastatin 80 mg which the dosage was reduced to 40 mg as you had some leg cramps.   Please continue to take Pravastatin daily and follow up with your PCP and Cardiologist for further management and adjustment of medication if required.   Kindly monitor you diet and decrease your intake of salt, sugar, saturated fat. Please exercise 30-45 minutes per day for 5 days a week as tolerated.    Diagnosis: DM (diabetes mellitus)  Assessment and Plan of Treatment: You have past medical history of diabetes. Your were provided with insulin which was adjusted based on your sugar levels during the hospital course. Your HbA1C which is your average three month sugar was 8.3%.   PLEASE continue your home medication and follow up with your PCP and Endocrinologist as outpatient for further adjsutmend of medication.  Diabetes mellitus especially when uncontrolled and along with elevated cholesterol levels increases the risk of coronary artery disease, heart attacks, strokes.   Diabetes increases the risk of diseases of the eye, nervous system, and kidney diseases as well. Kindly follow up with your PCP and Endocrinologist for referral to opthlamologist [eye doctor] yearly evaluation, podiatry [foot doctor] referral.   Kindly monitor you surgar level at home and monitor your diet and decrease your intake of salt, sugar, saturated fat. Please exercise 30-45 minutes per day for 5 days a week as tolerated.    Diagnosis: Peptic ulcer disease  Assessment and Plan of Treatment: You reported having peptic ulcer disease in the past. You were provided with Pantoprazole during the hospital course. Please continue with pantoprazole daily and follow up with the PCP and gastroenterologist for adjustment of the medication.     PRINCIPAL DISCHARGE DIAGNOSIS  Diagnosis: NSTEMI (non-ST elevation myocardial infarction)  Assessment and Plan of Treatment: You presented to the emergency department with severe chest pain. A set of laboratory tests and ECG were performed. Based on the abnormalities noted on the blood tests and ECG results and your symptoms, you were provided with some medications inlcuding aspirin, Brilinta and heparin for preparation of heart catheterization. During the catheterization procedure a 99% occlusion of Obtuse marginal artery was noted for which a drug eluting stent placed. An occlusion of proximal Left anterior descending artery [LAD] was noted as well however did not require stent placement at this time.   Of note during the procedure, an 18 mm stent was placed initially however as it was considered long for the lesion, this stent was REMOVED and then a 15 mm stent was placed instead. This means that currently there is ONLY ONE stent present at the obtuse marginal artery.   You were provided with aspirin, atorvastatin along with Plavix during the hospital course and require to continue the medication at home.   PLEASE continue both Aspirin 81 mg daily and Plavix 75 mg daily along with Metoprolol succinate [Toprol] 50 mg and Pravastatin 40 mg daily as it is highly required in the setting of coronary artery disease with history of stent placement.   Kindly follow up with your Cardiologist Dr. Iftikhar VAIL as outpatient for further evaluation, management, and adjustment of medication if requried.  You can follow up with the interventionalist Dr. Osborne who performed the procedure in 2-3 weeks as well.      SECONDARY DISCHARGE DIAGNOSES  Diagnosis: HLD (hyperlipidemia)  Assessment and Plan of Treatment: You have history of elevated cholesterol levels. Your LDL,total cholesterol and triglycerides were elevated which increases your risk of coronary artery disease especially in the setting of Diabetes. You were provided with Atorvastatin 80 mg which the dosage was reduced to 40 mg as you had some leg cramps.   Please continue to take Pravastatin daily and follow up with your PCP and Cardiologist for further management and adjustment of medication if required.   Kindly monitor you diet and decrease your intake of salt, sugar, saturated fat. Please exercise 30-45 minutes per day for 5 days a week as tolerated.    Diagnosis: DM (diabetes mellitus)  Assessment and Plan of Treatment: You have past medical history of diabetes. Your were provided with insulin which was adjusted based on your sugar levels during the hospital course. Your HbA1C which is your average three month sugar was 8.3%.   PLEASE continue your home medication and follow up with your PCP and Endocrinologist as outpatient for further adjsutmend of medication.  Diabetes mellitus especially when uncontrolled and along with elevated cholesterol levels increases the risk of coronary artery disease, heart attacks, strokes.   Diabetes increases the risk of diseases of the eye, nervous system, and kidney diseases as well. Kindly follow up with your PCP and Endocrinologist for referral to opthlamologist [eye doctor] yearly evaluation, podiatry [foot doctor] referral.   Kindly monitor you surgar level at home and monitor your diet and decrease your intake of salt, sugar, saturated fat. Please exercise 30-45 minutes per day for 5 days a week as tolerated.    Diagnosis: Peptic ulcer disease  Assessment and Plan of Treatment: You reported having peptic ulcer disease in the past. You were provided with Pantoprazole during the hospital course. Please continue with pantoprazole daily and follow up with the PCP and gastroenterologist for adjustment of the medication.

## 2024-02-05 NOTE — DISCHARGE NOTE PROVIDER - NSDCFUADDAPPT_GEN_ALL_CORE_FT
The appointment with cardiology specialist has been made, PLEASE follow up with Dr. Mathew Henriquez on 2/29/2024 at 8:00 AM.     If you would like to follow with Dr. Wallace, kindly call his office at [phone] to schedule an appointment.       Kindly make an appointment with your Endocrinologist at the earliest for adjustment of your Diabetes medications and further management.

## 2024-02-05 NOTE — DISCHARGE NOTE PROVIDER - NSDCCAREPROVSEEN_GEN_ALL_CORE_FT
Jacobo, Mickie Osborne, Saji Villatoro, Hodan Hendricks, Genesis Jacobo, Mickie Osborne, Saji Villatoro, Hodan Hendricks, Genesis Dixon, Samson Mckeon

## 2024-02-05 NOTE — DISCHARGE NOTE PROVIDER - CARE PROVIDERS DIRECT ADDRESSES
,[email or web address]

## 2024-02-05 NOTE — PROGRESS NOTE ADULT - SUBJECTIVE AND OBJECTIVE BOX
Patient is a 58y old  Female who presents with a chief complaint of chest pain (04 Feb 2024 16:47)    INTERVAL HISTORY/ OVERNIGHT EVENTS:   No acute overnight events. Pt refused taking Verapamil as per the night team and RN in AM.  Pt           PRESSORS: [ ] YES [x ] NO      Cardiovascular:  metoprolol tartrate 25 milliGRAM(s) Oral two times a day    Pulmonary:    Hematalogic:  aspirin enteric coated 81 milliGRAM(s) Oral daily  ticagrelor 90 milliGRAM(s) Oral every 12 hours    Other:  acetaminophen     Tablet .. 650 milliGRAM(s) Oral every 4 hours PRN  aluminum hydroxide/magnesium hydroxide/simethicone Suspension 30 milliLiter(s) Oral every 4 hours PRN  atorvastatin 80 milliGRAM(s) Oral at bedtime  dextrose 50% Injectable 25 Gram(s) IV Push once  dextrose 50% Injectable 12.5 Gram(s) IV Push once  insulin lispro (ADMELOG) corrective regimen sliding scale   SubCutaneous at bedtime  insulin lispro (ADMELOG) corrective regimen sliding scale   SubCutaneous three times a day before meals  levothyroxine 75 MICROGram(s) Oral daily  melatonin 3 milliGRAM(s) Oral at bedtime PRN  pantoprazole    Tablet 40 milliGRAM(s) Oral before breakfast    acetaminophen     Tablet .. 650 milliGRAM(s) Oral every 4 hours PRN  aluminum hydroxide/magnesium hydroxide/simethicone Suspension 30 milliLiter(s) Oral every 4 hours PRN  aspirin enteric coated 81 milliGRAM(s) Oral daily  atorvastatin 80 milliGRAM(s) Oral at bedtime  dextrose 50% Injectable 25 Gram(s) IV Push once  dextrose 50% Injectable 12.5 Gram(s) IV Push once  insulin lispro (ADMELOG) corrective regimen sliding scale   SubCutaneous at bedtime  insulin lispro (ADMELOG) corrective regimen sliding scale   SubCutaneous three times a day before meals  levothyroxine 75 MICROGram(s) Oral daily  melatonin 3 milliGRAM(s) Oral at bedtime PRN  metoprolol tartrate 25 milliGRAM(s) Oral two times a day  pantoprazole    Tablet 40 milliGRAM(s) Oral before breakfast  ticagrelor 90 milliGRAM(s) Oral every 12 hours    Drug Dosing Weight  Height (cm): 157.5 (03 Feb 2024 14:19)  Weight (kg): 65.317 (02 Feb 2024 19:31)  BMI (kg/m2): 26.3 (03 Feb 2024 14:19)  BSA (m2): 1.66 (03 Feb 2024 14:19)    CENTRAL LINE: [ ] YES [ ] NO  LOCATION:     MERIDA: [ ] YES [ ] NO      A-LINE:  [ ] YES [ ] NO  LOCATION:         ICU Vital Signs Last 24 Hrs  T(C): 36.9 (05 Feb 2024 04:00), Max: 37.2 (04 Feb 2024 08:00)  T(F): 98.4 (05 Feb 2024 04:00), Max: 99 (04 Feb 2024 08:00)  HR: 97 (05 Feb 2024 07:00) (89 - 109)  BP: 118/66 (05 Feb 2024 07:00) (83/56 - 145/75)  BP(mean): 78 (05 Feb 2024 07:00) (62 - 100)  ABP: --  ABP(mean): --  RR: 15 (05 Feb 2024 07:00) (12 - 26)  SpO2: 100% (05 Feb 2024 07:00) (95% - 100%)    O2 Parameters below as of 05 Feb 2024 06:00  Patient On (Oxygen Delivery Method): room air                  02-04 @ 07:01  -  02-05 @ 07:00  --------------------------------------------------------  IN: 0 mL / OUT: 1450 mL / NET: -1450 mL            PHYSICAL EXAM:    GENERAL: NAD, well-groomed, well-developed  EYES: EOMI, PERRLA,   NECK: Supple, No JVD; Normal thyroid; Trachea midline  NERVOUS SYSTEM:  Alert & Oriented X3,  Motor Strength 5/5 B/L upper and lower extremities; DTRs 2+ intact and symmetric  CHEST/LUNG: No rales, rhonchi, wheezing   HEART: Regular rate and rhythm; No murmurs,   ABDOMEN: Soft, Nontender, Nondistended; Bowel sounds present  EXTREMITIES:  2+ Peripheral Pulses, No clubbing, cyanosis, or edema      LABS:                        13.1   14.09 )-----------( 209      ( 05 Feb 2024 06:35 )             39.6     02-04    134<L>  |  101  |  7   ----------------------------<  225<H>  4.0   |  20<L>  |  0.56    Ca    9.0      04 Feb 2024 07:10  Phos  2.7     02-04  Mg     2.00     02-04    TPro  7.2  /  Alb  3.6  /  TBili  0.6  /  DBili  x   /  AST  55<H>  /  ALT  26  /  AlkPhos  106  02-04    PTT - ( 04 Feb 2024 06:15 )  PTT:85.8 sec  Urinalysis Basic - ( 04 Feb 2024 07:10 )    Color: x / Appearance: x / SG: x / pH: x  Gluc: 225 mg/dL / Ketone: x  / Bili: x / Urobili: x   Blood: x / Protein: x / Nitrite: x   Leuk Esterase: x / RBC: x / WBC x   Sq Epi: x / Non Sq Epi: x / Bacteria: x      CAPILLARY BLOOD GLUCOSE      POCT Blood Glucose.: 221 mg/dL (04 Feb 2024 20:09)  POCT Blood Glucose.: 207 mg/dL (04 Feb 2024 16:47)  POCT Blood Glucose.: 286 mg/dL (04 Feb 2024 11:23)        RADIOLOGY & ADDITIONAL STUDIES REVIEWED:  ***     Patient is a 58y old  Female who presents with a chief complaint of chest pain (04 Feb 2024 16:47)    INTERVAL HISTORY/ OVERNIGHT EVENTS:   No acute overnight events.       PRESSORS: [ ] YES [x ] NO      Cardiovascular:  metoprolol tartrate 25 milliGRAM(s) Oral two times a day    Pulmonary:    Hematalogic:  aspirin enteric coated 81 milliGRAM(s) Oral daily  ticagrelor 90 milliGRAM(s) Oral every 12 hours    Other:  acetaminophen     Tablet .. 650 milliGRAM(s) Oral every 4 hours PRN  aluminum hydroxide/magnesium hydroxide/simethicone Suspension 30 milliLiter(s) Oral every 4 hours PRN  atorvastatin 80 milliGRAM(s) Oral at bedtime  dextrose 50% Injectable 25 Gram(s) IV Push once  dextrose 50% Injectable 12.5 Gram(s) IV Push once  insulin lispro (ADMELOG) corrective regimen sliding scale   SubCutaneous at bedtime  insulin lispro (ADMELOG) corrective regimen sliding scale   SubCutaneous three times a day before meals  levothyroxine 75 MICROGram(s) Oral daily  melatonin 3 milliGRAM(s) Oral at bedtime PRN  pantoprazole    Tablet 40 milliGRAM(s) Oral before breakfast    acetaminophen     Tablet .. 650 milliGRAM(s) Oral every 4 hours PRN  aluminum hydroxide/magnesium hydroxide/simethicone Suspension 30 milliLiter(s) Oral every 4 hours PRN  aspirin enteric coated 81 milliGRAM(s) Oral daily  atorvastatin 80 milliGRAM(s) Oral at bedtime  dextrose 50% Injectable 25 Gram(s) IV Push once  dextrose 50% Injectable 12.5 Gram(s) IV Push once  insulin lispro (ADMELOG) corrective regimen sliding scale   SubCutaneous at bedtime  insulin lispro (ADMELOG) corrective regimen sliding scale   SubCutaneous three times a day before meals  levothyroxine 75 MICROGram(s) Oral daily  melatonin 3 milliGRAM(s) Oral at bedtime PRN  metoprolol tartrate 25 milliGRAM(s) Oral two times a day  pantoprazole    Tablet 40 milliGRAM(s) Oral before breakfast  ticagrelor 90 milliGRAM(s) Oral every 12 hours    Drug Dosing Weight  Height (cm): 157.5 (03 Feb 2024 14:19)  Weight (kg): 65.317 (02 Feb 2024 19:31)  BMI (kg/m2): 26.3 (03 Feb 2024 14:19)  BSA (m2): 1.66 (03 Feb 2024 14:19)    CENTRAL LINE: [ ] YES [ ] NO  LOCATION:     MERIDA: [ ] YES [ ] NO      A-LINE:  [ ] YES [ ] NO  LOCATION:         ICU Vital Signs Last 24 Hrs  T(C): 36.9 (05 Feb 2024 04:00), Max: 37.2 (04 Feb 2024 08:00)  T(F): 98.4 (05 Feb 2024 04:00), Max: 99 (04 Feb 2024 08:00)  HR: 97 (05 Feb 2024 07:00) (89 - 109)  BP: 118/66 (05 Feb 2024 07:00) (83/56 - 145/75)  BP(mean): 78 (05 Feb 2024 07:00) (62 - 100)  ABP: --  ABP(mean): --  RR: 15 (05 Feb 2024 07:00) (12 - 26)  SpO2: 100% (05 Feb 2024 07:00) (95% - 100%)    O2 Parameters below as of 05 Feb 2024 06:00  Patient On (Oxygen Delivery Method): room air                  02-04 @ 07:01  -  02-05 @ 07:00  --------------------------------------------------------  IN: 0 mL / OUT: 1450 mL / NET: -1450 mL            PHYSICAL EXAM:    GENERAL: NAD, well-groomed, well-developed  EYES: EOMI, PERRLA,   NECK: Supple, No JVD; Normal thyroid; Trachea midline  NERVOUS SYSTEM:  Alert & Oriented X3,  Motor Strength 5/5 B/L upper and lower extremities; DTRs 2+ intact and symmetric  CHEST/LUNG: No rales, rhonchi, wheezing   HEART: Regular rate and rhythm; No murmurs,   ABDOMEN: Soft, Nontender, Nondistended; Bowel sounds present  EXTREMITIES:  2+ Peripheral Pulses, No clubbing, cyanosis, or edema      LABS:                        13.1   14.09 )-----------( 209      ( 05 Feb 2024 06:35 )             39.6     02-04    134<L>  |  101  |  7   ----------------------------<  225<H>  4.0   |  20<L>  |  0.56    Ca    9.0      04 Feb 2024 07:10  Phos  2.7     02-04  Mg     2.00     02-04    TPro  7.2  /  Alb  3.6  /  TBili  0.6  /  DBili  x   /  AST  55<H>  /  ALT  26  /  AlkPhos  106  02-04    PTT - ( 04 Feb 2024 06:15 )  PTT:85.8 sec  Urinalysis Basic - ( 04 Feb 2024 07:10 )    Color: x / Appearance: x / SG: x / pH: x  Gluc: 225 mg/dL / Ketone: x  / Bili: x / Urobili: x   Blood: x / Protein: x / Nitrite: x   Leuk Esterase: x / RBC: x / WBC x   Sq Epi: x / Non Sq Epi: x / Bacteria: x      CAPILLARY BLOOD GLUCOSE      POCT Blood Glucose.: 221 mg/dL (04 Feb 2024 20:09)  POCT Blood Glucose.: 207 mg/dL (04 Feb 2024 16:47)  POCT Blood Glucose.: 286 mg/dL (04 Feb 2024 11:23)        RADIOLOGY & ADDITIONAL STUDIES REVIEWED:  ***     Patient is a 58y old  Female who presents with a chief complaint of chest pain (04 Feb 2024 16:47)    INTERVAL HISTORY/ OVERNIGHT EVENTS:   No acute overnight events. Pt stated that she was not explained fully the requirement for a second cath today and would like to have further discussion with the team and interventionalist for cath vs medical mnx. Reported to the patient that based on the chart, 2 stents were used for the OM lesion yesterday.   Discussion and education on diet and exercise, Diabetes and lipid management done in length. Explained to the patient that having UTIs from [facility] is not an allergic reaction, merely that the medication causes removal of sugar into the urine which increases risk of infection and the fact the it was removed by Endo specialist at that time may have been a correct choice based on the context. However as currently there has been a NSTEMI event with mildly reduced LV dysfunction and EF, it may be the a medication to re-consider.      Today pt reported that her chest pain has resolved and no dyspnea, palpitations however reported of having mild headache which she attributed to the DAP agents       PRESSORS: [ ] YES [x ] NO      Cardiovascular:  metoprolol tartrate 25 milliGRAM(s) Oral two times a day    Pulmonary:    Hematalogic:  aspirin enteric coated 81 milliGRAM(s) Oral daily  ticagrelor 90 milliGRAM(s) Oral every 12 hours    Other:  acetaminophen     Tablet .. 650 milliGRAM(s) Oral every 4 hours PRN  aluminum hydroxide/magnesium hydroxide/simethicone Suspension 30 milliLiter(s) Oral every 4 hours PRN  atorvastatin 80 milliGRAM(s) Oral at bedtime  dextrose 50% Injectable 25 Gram(s) IV Push once  dextrose 50% Injectable 12.5 Gram(s) IV Push once  insulin lispro (ADMELOG) corrective regimen sliding scale   SubCutaneous at bedtime  insulin lispro (ADMELOG) corrective regimen sliding scale   SubCutaneous three times a day before meals  levothyroxine 75 MICROGram(s) Oral daily  melatonin 3 milliGRAM(s) Oral at bedtime PRN  pantoprazole    Tablet 40 milliGRAM(s) Oral before breakfast    acetaminophen     Tablet .. 650 milliGRAM(s) Oral every 4 hours PRN  aluminum hydroxide/magnesium hydroxide/simethicone Suspension 30 milliLiter(s) Oral every 4 hours PRN  aspirin enteric coated 81 milliGRAM(s) Oral daily  atorvastatin 80 milliGRAM(s) Oral at bedtime  dextrose 50% Injectable 25 Gram(s) IV Push once  dextrose 50% Injectable 12.5 Gram(s) IV Push once  insulin lispro (ADMELOG) corrective regimen sliding scale   SubCutaneous at bedtime  insulin lispro (ADMELOG) corrective regimen sliding scale   SubCutaneous three times a day before meals  levothyroxine 75 MICROGram(s) Oral daily  melatonin 3 milliGRAM(s) Oral at bedtime PRN  metoprolol tartrate 25 milliGRAM(s) Oral two times a day  pantoprazole    Tablet 40 milliGRAM(s) Oral before breakfast  ticagrelor 90 milliGRAM(s) Oral every 12 hours    Drug Dosing Weight  Height (cm): 157.5 (03 Feb 2024 14:19)  Weight (kg): 65.317 (02 Feb 2024 19:31)  BMI (kg/m2): 26.3 (03 Feb 2024 14:19)  BSA (m2): 1.66 (03 Feb 2024 14:19)    CENTRAL LINE: [ ] YES [x ] NO   MERIDA: [ ] YES [x ] NO      A-LINE:  [ ] YES [x ] NO      ICU Vital Signs Last 24 Hrs  T(C): 36.9 (05 Feb 2024 04:00), Max: 37.2 (04 Feb 2024 08:00)  T(F): 98.4 (05 Feb 2024 04:00), Max: 99 (04 Feb 2024 08:00)  HR: 97 (05 Feb 2024 07:00) (89 - 109)  BP: 118/66 (05 Feb 2024 07:00) (83/56 - 145/75)  BP(mean): 78 (05 Feb 2024 07:00) (62 - 100)  ABP: --  ABP(mean): --  RR: 15 (05 Feb 2024 07:00) (12 - 26)  SpO2: 100% (05 Feb 2024 07:00) (95% - 100%)    O2 Parameters below as of 05 Feb 2024 06:00  Patient On (Oxygen Delivery Method): room air                  02-04 @ 07:01  -  02-05 @ 07:00  --------------------------------------------------------  IN: 0 mL / OUT: 1450 mL / NET: -1450 mL            PHYSICAL EXAM:    GENERAL: NAD, well-groomed, well-developed  EYES: EOMI, PERRLA,   NECK: Supple, No JVD; Normal thyroid; Trachea midline  NERVOUS SYSTEM:  Alert & Oriented X3,  Motor Strength 5/5 B/L upper and lower extremities; DTRs 2+ intact and symmetric  CHEST/LUNG: No rales, rhonchi, wheezing   HEART: Regular rate and rhythm; No murmurs,   ABDOMEN: Soft, Nontender, Nondistended; Bowel sounds present  EXTREMITIES:  2+ Peripheral Pulses, No clubbing, cyanosis, or edema      LABS:                        13.1   14.09 )-----------( 209      ( 05 Feb 2024 06:35 )             39.6     02-04    134<L>  |  101  |  7   ----------------------------<  225<H>  4.0   |  20<L>  |  0.56    Ca    9.0      04 Feb 2024 07:10  Phos  2.7     02-04  Mg     2.00     02-04    TPro  7.2  /  Alb  3.6  /  TBili  0.6  /  DBili  x   /  AST  55<H>  /  ALT  26  /  AlkPhos  106  02-04    PTT - ( 04 Feb 2024 06:15 )  PTT:85.8 sec  Urinalysis Basic - ( 04 Feb 2024 07:10 )    Color: x / Appearance: x / SG: x / pH: x  Gluc: 225 mg/dL / Ketone: x  / Bili: x / Urobili: x   Blood: x / Protein: x / Nitrite: x   Leuk Esterase: x / RBC: x / WBC x   Sq Epi: x / Non Sq Epi: x / Bacteria: x      CAPILLARY BLOOD GLUCOSE      POCT Blood Glucose.: 221 mg/dL (04 Feb 2024 20:09)  POCT Blood Glucose.: 207 mg/dL (04 Feb 2024 16:47)  POCT Blood Glucose.: 286 mg/dL (04 Feb 2024 11:23)        RADIOLOGY & ADDITIONAL STUDIES REVIEWED:  ***

## 2024-02-05 NOTE — DISCHARGE NOTE PROVIDER - PROVIDER TOKENS
PROVIDER:[TOKEN:[[id number]]] PROVIDER:[TOKEN:[[id number]]],PROVIDER:[TOKEN:[[id number]]]

## 2024-02-05 NOTE — PROGRESS NOTE ADULT - ASSESSMENT
58F with PMHx hypothyroidism, DM2, HLD, remote hx nonbleeding peptic ulcers presenting with chest pain today. Admitted for NSTEMI    #NSTEMI (non-ST elevation myocardial infarction).   s/p cardiac cath with stent placement   started on brilinta   - toprol xl 25 qd if bp can tolerate  -echo noted, mild lv dysfxn    #Type 2 diabetes mellitus   -Hold metformin  c/w insulin / FSBS     #HLD (hyperlipidemia).   -cont statin    #Hypothyroidism.   -levothyroxine 75mcg qd.    #History of gastric ulcer.   -ppi    d/c planning if cleared by cardio

## 2024-02-05 NOTE — DISCHARGE NOTE PROVIDER - NSDCFUSCHEDAPPT_GEN_ALL_CORE_FT
Mathew Henriquez  [facility] Physician [facility]  CARDIOLOGY [id number]  Scheduled Appointment: 02/29/2024

## 2024-02-06 ENCOUNTER — TRANSCRIPTION ENCOUNTER (OUTPATIENT)
Age: 59
End: 2024-02-06

## 2024-02-06 VITALS — HEART RATE: 97 BPM | RESPIRATION RATE: 16 BRPM

## 2024-02-06 LAB
ALBUMIN SERPL ELPH-MCNC: 3.8 G/DL — SIGNIFICANT CHANGE UP (ref 3.3–5)
ALP SERPL-CCNC: 135 U/L — HIGH (ref 40–120)
ALT FLD-CCNC: 38 U/L — HIGH (ref 4–33)
ANION GAP SERPL CALC-SCNC: 11 MMOL/L — SIGNIFICANT CHANGE UP (ref 7–14)
APTT BLD: 34.4 SEC — SIGNIFICANT CHANGE UP (ref 24.5–35.6)
AST SERPL-CCNC: 40 U/L — HIGH (ref 4–32)
BASE EXCESS BLDV CALC-SCNC: -0.7 MMOL/L — SIGNIFICANT CHANGE UP (ref -2–3)
BILIRUB SERPL-MCNC: 0.8 MG/DL — SIGNIFICANT CHANGE UP (ref 0.2–1.2)
BLOOD GAS VENOUS COMPREHENSIVE RESULT: SIGNIFICANT CHANGE UP
BUN SERPL-MCNC: 10 MG/DL — SIGNIFICANT CHANGE UP (ref 7–23)
CALCIUM SERPL-MCNC: 9.2 MG/DL — SIGNIFICANT CHANGE UP (ref 8.4–10.5)
CHLORIDE BLDV-SCNC: 104 MMOL/L — SIGNIFICANT CHANGE UP (ref 96–108)
CHLORIDE SERPL-SCNC: 105 MMOL/L — SIGNIFICANT CHANGE UP (ref 98–107)
CK MB BLD-MCNC: 2.6 % — HIGH (ref 0–2.5)
CK MB CFR SERPL CALC: 3.6 NG/ML — SIGNIFICANT CHANGE UP
CK SERPL-CCNC: 137 U/L — SIGNIFICANT CHANGE UP (ref 25–170)
CO2 BLDV-SCNC: 25.4 MMOL/L — SIGNIFICANT CHANGE UP (ref 22–26)
CO2 SERPL-SCNC: 23 MMOL/L — SIGNIFICANT CHANGE UP (ref 22–31)
CREAT SERPL-MCNC: 0.62 MG/DL — SIGNIFICANT CHANGE UP (ref 0.5–1.3)
EGFR: 104 ML/MIN/1.73M2 — SIGNIFICANT CHANGE UP
GAS PNL BLDV: 136 MMOL/L — SIGNIFICANT CHANGE UP (ref 136–145)
GAS PNL BLDV: SIGNIFICANT CHANGE UP
GLUCOSE BLDC GLUCOMTR-MCNC: 179 MG/DL — HIGH (ref 70–99)
GLUCOSE BLDC GLUCOMTR-MCNC: 234 MG/DL — HIGH (ref 70–99)
GLUCOSE BLDV-MCNC: 184 MG/DL — HIGH (ref 70–99)
GLUCOSE SERPL-MCNC: 177 MG/DL — HIGH (ref 70–99)
HCO3 BLDV-SCNC: 24 MMOL/L — SIGNIFICANT CHANGE UP (ref 22–29)
HCT VFR BLD CALC: 37.9 % — SIGNIFICANT CHANGE UP (ref 34.5–45)
HCT VFR BLDA CALC: 38 % — SIGNIFICANT CHANGE UP (ref 34.5–46.5)
HGB BLD CALC-MCNC: 12.7 G/DL — SIGNIFICANT CHANGE UP (ref 11.7–16.1)
HGB BLD-MCNC: 12.5 G/DL — SIGNIFICANT CHANGE UP (ref 11.5–15.5)
LACTATE BLDV-MCNC: 0.9 MMOL/L — SIGNIFICANT CHANGE UP (ref 0.5–2)
MAGNESIUM SERPL-MCNC: 2.3 MG/DL — SIGNIFICANT CHANGE UP (ref 1.6–2.6)
MCHC RBC-ENTMCNC: 26.6 PG — LOW (ref 27–34)
MCHC RBC-ENTMCNC: 33 GM/DL — SIGNIFICANT CHANGE UP (ref 32–36)
MCV RBC AUTO: 80.6 FL — SIGNIFICANT CHANGE UP (ref 80–100)
NRBC # BLD: 0 /100 WBCS — SIGNIFICANT CHANGE UP (ref 0–0)
NRBC # FLD: 0 K/UL — SIGNIFICANT CHANGE UP (ref 0–0)
PCO2 BLDV: 40 MMHG — SIGNIFICANT CHANGE UP (ref 39–52)
PH BLDV: 7.39 — SIGNIFICANT CHANGE UP (ref 7.32–7.43)
PHOSPHATE SERPL-MCNC: 3.9 MG/DL — SIGNIFICANT CHANGE UP (ref 2.5–4.5)
PLATELET # BLD AUTO: 215 K/UL — SIGNIFICANT CHANGE UP (ref 150–400)
PO2 BLDV: 67 MMHG — HIGH (ref 25–45)
POTASSIUM BLDV-SCNC: 3.9 MMOL/L — SIGNIFICANT CHANGE UP (ref 3.5–5.1)
POTASSIUM SERPL-MCNC: 4.1 MMOL/L — SIGNIFICANT CHANGE UP (ref 3.5–5.3)
POTASSIUM SERPL-SCNC: 4.1 MMOL/L — SIGNIFICANT CHANGE UP (ref 3.5–5.3)
PROT SERPL-MCNC: 7.6 G/DL — SIGNIFICANT CHANGE UP (ref 6–8.3)
RBC # BLD: 4.7 M/UL — SIGNIFICANT CHANGE UP (ref 3.8–5.2)
RBC # FLD: 13.3 % — SIGNIFICANT CHANGE UP (ref 10.3–14.5)
SAO2 % BLDV: 94.2 % — HIGH (ref 67–88)
SODIUM SERPL-SCNC: 139 MMOL/L — SIGNIFICANT CHANGE UP (ref 135–145)
TROPONIN T, HIGH SENSITIVITY RESULT: 928 NG/L — CRITICAL HIGH
WBC # BLD: 11.5 K/UL — HIGH (ref 3.8–10.5)
WBC # FLD AUTO: 11.5 K/UL — HIGH (ref 3.8–10.5)

## 2024-02-06 RX ORDER — METOPROLOL TARTRATE 50 MG
1 TABLET ORAL
Qty: 30 | Refills: 0
Start: 2024-02-06 | End: 2024-03-06

## 2024-02-06 RX ORDER — ASPIRIN/CALCIUM CARB/MAGNESIUM 324 MG
1 TABLET ORAL
Qty: 30 | Refills: 0
Start: 2024-02-06 | End: 2024-03-06

## 2024-02-06 RX ORDER — CLOPIDOGREL BISULFATE 75 MG/1
1 TABLET, FILM COATED ORAL
Qty: 30 | Refills: 0
Start: 2024-02-06 | End: 2024-03-06

## 2024-02-06 RX ADMIN — Medication 75 MICROGRAM(S): at 06:28

## 2024-02-06 RX ADMIN — Medication 81 MILLIGRAM(S): at 11:56

## 2024-02-06 RX ADMIN — Medication 2: at 11:14

## 2024-02-06 RX ADMIN — Medication 1: at 07:56

## 2024-02-06 RX ADMIN — PANTOPRAZOLE SODIUM 40 MILLIGRAM(S): 20 TABLET, DELAYED RELEASE ORAL at 06:28

## 2024-02-06 RX ADMIN — Medication 25 MILLIGRAM(S): at 06:28

## 2024-02-06 RX ADMIN — CLOPIDOGREL BISULFATE 600 MILLIGRAM(S): 75 TABLET, FILM COATED ORAL at 06:29

## 2024-02-06 NOTE — PROGRESS NOTE ADULT - ASSESSMENT
A/.P    58F with PMHx hypothyroidism, DM2, HLD, remote hx nonbleeding peptic ulcers presenting with chest pain today. Admitted for NSTEMI    #NSTEMI (non-ST elevation myocardial infarction).   -SP [facility] on 2/4 -->OM1-99% 2 stents placed---Moderate eccentric atherosclerosis of the pLAD.  Patient does not want  staged procedure to test or treat this lesion at this time and would like to treat this medically  -hd stable  -continue DAP  -toprol xl 25 qd  -echo noted, mild lv dysfxn  -hold off entresto vs arb given borderline low BP   -Outpatient follow-up in post PCI clinic in 2-3 weeks' time with DR DC      #Type 2 diabetes mellitus with hyperglycemia, without long-term current use of insulin.   -Hold metformin  -med f/u    #HLD (hyperlipidemia).   -cont statin    #Hypothyroidism.   -levothyroxine 75mcg qd.    #History of gastric ulcer.   -ppi    dcp per ccu

## 2024-02-06 NOTE — PROGRESS NOTE ADULT - TIME BILLING
agree with above  images reviewed  lesion appears more moderate   pt hesitant to proceed with another angio  recommend med mgmt for now  followup w outpt cardio([facility])  cont med mgmt
agree with above  images reviewed  leasion appears more moderate   pt hesitant to proceed   recommend med mgmt   followup w outpt cardio  cont med mgmt

## 2024-02-06 NOTE — PROGRESS NOTE ADULT - SUBJECTIVE AND OBJECTIVE BOX
CARDIOLOGY FOLLOW UP - Dr. Wallace  DATE OF SERVICE: 2/6/24    CC no cp or sob   some anxiety this am       REVIEW OF SYSTEMS:  CONSTITUTIONAL: No fever, weight loss, or fatigue  RESPIRATORY: No cough, wheezing, chills or hemoptysis; No Shortness of Breath  CARDIOVASCULAR: No chest pain, palpitations, passing out, dizziness, or leg swelling  GASTROINTESTINAL: No abdominal or epigastric pain. No nausea, vomiting, or hematemesis; No diarrhea or constipation. No melena or hematochezia.  VASCULAR: No edema     PHYSICAL EXAM:  T(C): 36.7 (02-06-24 @ 08:00), Max: 36.8 (02-05-24 @ 12:00)  HR: 90 (02-06-24 @ 09:00) (83 - 118)  BP: 102/62 (02-06-24 @ 09:00) (96/64 - 139/67)  RR: 12 (02-06-24 @ 09:00) (10 - 21)  SpO2: 100% (02-06-24 @ 09:00) (97% - 100%)  Wt(kg): --  I&O's Summary    05 Feb 2024 07:01  -  06 Feb 2024 07:00  --------------------------------------------------------  IN: 0 mL / OUT: 850 mL / NET: -850 mL    06 Feb 2024 07:01  -  06 Feb 2024 10:09  --------------------------------------------------------  IN: 250 mL / OUT: 300 mL / NET: -50 mL        Appearance: Normal	  Cardiovascular: Normal S1 S2,RRR, No JVD, No murmurs  Respiratory: Lungs clear to auscultation	  Gastrointestinal:  Soft, Non-tender, + BS	  Extremities: Normal range of motion, No clubbing, cyanosis or edema      Home Medications:  levothyroxine 75 mcg (0.075 mg) oral tablet: 1 tab(s) orally once a day (02 Feb 2024 21:09)  MetFORMIN (Eqv-Glucophage XR) 500 mg oral tablet, extended release: 2 tab(s) orally once a day (02 Feb 2024 21:09)  pravastatin 40 mg oral tablet: 1 tab(s) orally once a day (02 Feb 2024 21:09)      MEDICATIONS  (STANDING):  aspirin enteric coated 81 milliGRAM(s) Oral daily  atorvastatin 40 milliGRAM(s) Oral at bedtime  dextrose 50% Injectable 25 Gram(s) IV Push once  dextrose 50% Injectable 12.5 Gram(s) IV Push once  insulin lispro (ADMELOG) corrective regimen sliding scale   SubCutaneous at bedtime  insulin lispro (ADMELOG) corrective regimen sliding scale   SubCutaneous three times a day before meals  levothyroxine 75 MICROGram(s) Oral daily  metoprolol tartrate 25 milliGRAM(s) Oral two times a day  pantoprazole    Tablet 40 milliGRAM(s) Oral before breakfast      TELEMETRY: snr 	    ECG:  	  RADIOLOGY:   DIAGNOSTIC TESTING:  [ ] Echocardiogram:  [ ]  Catheterization:  < from: Cardiac Catheterization (02.04.24 @ 12:44) >      Cath Lab Report    Diagnostic Cardiologist:       Saji Osborne MD   Interventional Cardiologist:   Saji Osborne MD   Fellow:                        Sunil Diaz, Fellow   Referring Physician:           Mickie Centeno MD         Conclusions:   Successful coronary angiography and left heart catheterization from  right radial access site with crossover to right femoral    access site due to short root and difficult LM engagement.     EDP elevated at 25 mmHg with greater than 30 mmHg gradient from LV to  aorta.    Severe atherosclerosis of the OM1 s/p PCI with DANN (culprit)     Moderate eccentric atherosclerosis of the pLAD.  Patient does not want  staged procedure to test or treat this lesion at this time  and would like to treat this medically.  Can consider stress testing  as outpatient to evaluate this territory for ischemia.    MODERATE SEDATION STATEMENT: Moderate sedation was administered and  monitored under the direct supervision of  attending physician, Saji Osborne MD, from 12:45pm to 1:59pm.   Recommendations:     Continue DAPT for at least 12 months.     GDMT for established CAD/cardiomyopathy.  Patient tachycardic during  last echocardiogram, recommend repeat study as  outpatient for assessment of EF and aortic valve.     Outpatient follow-up in post PCI clinic in 2-3 weeks' time with me.  This appointment has been requested.    Case results discussed with primary team, patient, and her sisters by  phone.  Acute complication:    No complications         [ ] Stress Test:    OTHER: 	    LABS:	 	    CKMB Units: 3.6 ng/mL (02-06 @ 04:35)  Creatine Kinase, Serum: 137 U/L [25 - 170] (02-06 @ 04:35)  Troponin T, High Sensitivity Result: 928 ng/L (02-06 @ 04:35)  Creatine Kinase, Serum: 309 U/L [25 - 170] (02-05 @ 06:35)  Troponin T, High Sensitivity Result: 906 ng/L (02-05 @ 06:35)  CKMB Units: 11.0 ng/mL (02-04 @ 07:10)  Creatine Kinase, Serum: 387 U/L [25 - 170] (02-04 @ 07:10)  Troponin T, High Sensitivity Result: 614 ng/L (02-04 @ 07:10)  CKMB Units: 25.4 ng/mL (02-03 @ 21:40)  Creatine Kinase, Serum: 587 U/L [25 - 170] (02-03 @ 21:40)  Troponin T, High Sensitivity Result: 641 ng/L (02-03 @ 21:40)  Creatine Kinase, Serum: 1052 U/L [25 - 170] (02-03 @ 10:37)  CKMB Units: 77.0 ng/mL (02-03 @ 10:37)  Troponin T, High Sensitivity Result: 1245 ng/L (02-03 @ 10:37)  Troponin T, High Sensitivity Result: 1272 ng/L (02-03 @ 10:37)  Troponin T, High Sensitivity Result: 1194 ng/L (02-03 @ 04:10)  Creatine Kinase, Serum: 1075 U/L [25 - 170] (02-03 @ 04:10)  CKMB Units: 102.1 ng/mL (02-03 @ 04:10)  Troponin T, High Sensitivity Result: 157 ng/L (02-02 @ 20:45)  Creatine Kinase, Serum: 375 U/L [25 - 170] (02-02 @ 20:45)  CKMB Units: 32.6 ng/mL (02-02 @ 20:45)  Troponin T, High Sensitivity Result: 62 ng/L (02-02 @ 17:37)  Creatine Kinase, Serum: 149 U/L [25 - 170] (02-02 @ 17:37)  CKMB Units: 9.9 ng/mL (02-02 @ 17:37)                          12.5   11.50 )-----------( 215      ( 06 Feb 2024 04:35 )             37.9     02-06    139  |  105  |  10  ----------------------------<  177<H>  4.1   |  23  |  0.62    Ca    9.2      06 Feb 2024 04:35  Phos  3.9     02-06  Mg     2.30     02-06    TPro  7.6  /  Alb  3.8  /  TBili  0.8  /  DBili  x   /  AST  40<H>  /  ALT  38<H>  /  AlkPhos  135<H>  02-06    PTT - ( 06 Feb 2024 04:35 )  PTT:34.4 sec

## 2024-02-06 NOTE — PROGRESS NOTE ADULT - ASSESSMENT
58F with PMHx hypothyroidism, DM2, HLD, remote hx nonbleeding peptic ulcers presenting with chest pain today. Admitted for NSTEMI    #NSTEMI (non-ST elevation myocardial infarction).   s/p cardiac cath with stent placement x 2   started on brilinta   - toprol xl 25 qd if bp can tolerate  -echo noted, mild lv dysfxn    #Type 2 diabetes mellitus   -Hold metformin  c/w insulin / FSBS     #HLD (hyperlipidemia).   -cont statin    #Hypothyroidism.   -levothyroxine 75mcg qd.    #History of gastric ulcer.   -ppi    dispo: pt. refusal for staged PCI , f/u with cardio as out pt. stable to be d/c

## 2024-02-06 NOTE — PROGRESS NOTE ADULT - SUBJECTIVE AND OBJECTIVE BOX
Patient is a 58y old  Female who presents with a chief complaint of chest pain (06 Feb 2024 10:09)      INTERVAL HPI/OVERNIGHT EVENTS: doing well , no CP  T(C): 36.5 (02-06-24 @ 12:00), Max: 36.7 (02-06-24 @ 08:00)  HR: 97 (02-06-24 @ 15:00) (83 - 117)  BP: 114/65 (02-06-24 @ 13:00) (96/64 - 139/67)  RR: 16 (02-06-24 @ 15:00) (11 - 21)  SpO2: 99% (02-06-24 @ 14:00) (99% - 100%)  Wt(kg): --  I&O's Summary    05 Feb 2024 07:01  -  06 Feb 2024 07:00  --------------------------------------------------------  IN: 0 mL / OUT: 850 mL / NET: -850 mL    06 Feb 2024 07:01  -  06 Feb 2024 20:14  --------------------------------------------------------  IN: 250 mL / OUT: 300 mL / NET: -50 mL        PAST MEDICAL & SURGICAL HISTORY:  Diabetes mellitus      HLD (hyperlipidemia)      Hypothyroidism      No significant past surgical history          SOCIAL HISTORY  Alcohol:  Tobacco:  Illicit substance use:    FAMILY HISTORY:    REVIEW OF SYSTEMS:  CONSTITUTIONAL: No fever, weight loss, or fatigue  EYES: No eye pain, visual disturbances, or discharge  ENMT:  No difficulty hearing, tinnitus, vertigo; No sinus or throat pain  NECK: No pain or stiffness  RESPIRATORY: No cough, wheezing, chills or hemoptysis; No shortness of breath  CARDIOVASCULAR: No chest pain, palpitations, dizziness, or leg swelling  GASTROINTESTINAL: No abdominal or epigastric pain. No nausea, vomiting, or hematemesis; No diarrhea or constipation. No melena or hematochezia.  GENITOURINARY: No dysuria, frequency, hematuria, or incontinence  NEUROLOGICAL: No headaches, memory loss, loss of strength, numbness, or tremors  SKIN: No itching, burning, rashes, or lesions   LYMPH NODES: No enlarged glands  ENDOCRINE: No heat or cold intolerance; No hair loss  MUSCULOSKELETAL: No joint pain or swelling; No muscle, back, or extremity pain  PSYCHIATRIC: No depression, anxiety, mood swings, or difficulty sleeping  HEME/LYMPH: No easy bruising, or bleeding gums  ALLERY AND IMMUNOLOGIC: No hives or eczema    RADIOLOGY & ADDITIONAL TESTS:    Imaging Personally Reviewed:  [ ] YES  [ ] NO    Consultant(s) Notes Reviewed:  [ ] YES  [ ] NO    PHYSICAL EXAM:  GENERAL: NAD, well-groomed, well-developed  HEAD:  Atraumatic, Normocephalic  EYES: EOMI, PERRLA, conjunctiva and sclera clear  ENMT: No tonsillar erythema, exudates, or enlargement; Moist mucous membranes, Good dentition, No lesions  NECK: Supple, No JVD, Normal thyroid  NERVOUS SYSTEM:  Alert & Oriented X3, Good concentration; Motor Strength 5/5 B/L upper and lower extremities; DTRs 2+ intact and symmetric  CHEST/LUNG: Clear to percussion bilaterally; No rales, rhonchi, wheezing, or rubs  HEART: Regular rate and rhythm; No murmurs, rubs, or gallops  ABDOMEN: Soft, Nontender, Nondistended; Bowel sounds present  EXTREMITIES:  2+ Peripheral Pulses, No clubbing, cyanosis, or edema  LYMPH: No lymphadenopathy noted  SKIN: No rashes or lesions    LABS:                        12.5   11.50 )-----------( 215      ( 06 Feb 2024 04:35 )             37.9     02-06    139  |  105  |  10  ----------------------------<  177<H>  4.1   |  23  |  0.62    Ca    9.2      06 Feb 2024 04:35  Phos  3.9     02-06  Mg     2.30     02-06    TPro  7.6  /  Alb  3.8  /  TBili  0.8  /  DBili  x   /  AST  40<H>  /  ALT  38<H>  /  AlkPhos  135<H>  02-06    PTT - ( 06 Feb 2024 04:35 )  PTT:34.4 sec  Urinalysis Basic - ( 06 Feb 2024 04:35 )    Color: x / Appearance: x / SG: x / pH: x  Gluc: 177 mg/dL / Ketone: x  / Bili: x / Urobili: x   Blood: x / Protein: x / Nitrite: x   Leuk Esterase: x / RBC: x / WBC x   Sq Epi: x / Non Sq Epi: x / Bacteria: x      CAPILLARY BLOOD GLUCOSE      POCT Blood Glucose.: 234 mg/dL (06 Feb 2024 11:11)  POCT Blood Glucose.: 179 mg/dL (06 Feb 2024 07:47)  POCT Blood Glucose.: 251 mg/dL (05 Feb 2024 23:20)        Urinalysis Basic - ( 06 Feb 2024 04:35 )    Color: x / Appearance: x / SG: x / pH: x  Gluc: 177 mg/dL / Ketone: x  / Bili: x / Urobili: x   Blood: x / Protein: x / Nitrite: x   Leuk Esterase: x / RBC: x / WBC x   Sq Epi: x / Non Sq Epi: x / Bacteria: x        MEDICATIONS  (STANDING):  aspirin enteric coated 81 milliGRAM(s) Oral daily  atorvastatin 40 milliGRAM(s) Oral at bedtime  dextrose 50% Injectable 25 Gram(s) IV Push once  dextrose 50% Injectable 12.5 Gram(s) IV Push once  insulin lispro (ADMELOG) corrective regimen sliding scale   SubCutaneous at bedtime  insulin lispro (ADMELOG) corrective regimen sliding scale   SubCutaneous three times a day before meals  levothyroxine 75 MICROGram(s) Oral daily  metoprolol tartrate 25 milliGRAM(s) Oral two times a day  pantoprazole    Tablet 40 milliGRAM(s) Oral before breakfast    MEDICATIONS  (PRN):  acetaminophen     Tablet .. 650 milliGRAM(s) Oral every 4 hours PRN Moderate Pain (4 - 6), Severe Pain (7 - 10)  aluminum hydroxide/magnesium hydroxide/simethicone Suspension 30 milliLiter(s) Oral every 4 hours PRN Dyspepsia  melatonin 3 milliGRAM(s) Oral at bedtime PRN Insomnia      Care Discussed with Consultants/Other Providers [ ] YES  [ ] NO

## 2024-02-06 NOTE — PROGRESS NOTE ADULT - SUBJECTIVE AND OBJECTIVE BOX
Patient is a 58y old  Female who presents with a chief complaint of chest pain (05 Feb 2024 14:27)    INTERVAL HISTORY/ OVERNIGHT EVENTS:   No acute overnight events. Pt seen and examined at bedside. Plavix loading dose was provided today and will start the daily Plavix 75 mg from tomorrow. Pt denied chest pain, palpitation dyspnea or other acute symptoms.     PRESSORS: [ ] YES [x ] NO    Antimicrobial:    Cardiovascular:  metoprolol tartrate 25 milliGRAM(s) Oral two times a day    Pulmonary:    Hematalogic:  aspirin enteric coated 81 milliGRAM(s) Oral daily    Other:  acetaminophen     Tablet .. 650 milliGRAM(s) Oral every 4 hours PRN  aluminum hydroxide/magnesium hydroxide/simethicone Suspension 30 milliLiter(s) Oral every 4 hours PRN  atorvastatin 40 milliGRAM(s) Oral at bedtime  dextrose 50% Injectable 25 Gram(s) IV Push once  dextrose 50% Injectable 12.5 Gram(s) IV Push once  insulin lispro (ADMELOG) corrective regimen sliding scale   SubCutaneous at bedtime  insulin lispro (ADMELOG) corrective regimen sliding scale   SubCutaneous three times a day before meals  levothyroxine 75 MICROGram(s) Oral daily  melatonin 3 milliGRAM(s) Oral at bedtime PRN  pantoprazole    Tablet 40 milliGRAM(s) Oral before breakfast    acetaminophen     Tablet .. 650 milliGRAM(s) Oral every 4 hours PRN  aluminum hydroxide/magnesium hydroxide/simethicone Suspension 30 milliLiter(s) Oral every 4 hours PRN  aspirin enteric coated 81 milliGRAM(s) Oral daily  atorvastatin 40 milliGRAM(s) Oral at bedtime  dextrose 50% Injectable 25 Gram(s) IV Push once  dextrose 50% Injectable 12.5 Gram(s) IV Push once  insulin lispro (ADMELOG) corrective regimen sliding scale   SubCutaneous three times a day before meals  insulin lispro (ADMELOG) corrective regimen sliding scale   SubCutaneous at bedtime  levothyroxine 75 MICROGram(s) Oral daily  melatonin 3 milliGRAM(s) Oral at bedtime PRN  metoprolol tartrate 25 milliGRAM(s) Oral two times a day  pantoprazole    Tablet 40 milliGRAM(s) Oral before breakfast    Drug Dosing Weight  Height (cm): 157.5 (03 Feb 2024 14:19)  Weight (kg): 65.317 (02 Feb 2024 19:31)  BMI (kg/m2): 26.3 (03 Feb 2024 14:19)  BSA (m2): 1.66 (03 Feb 2024 14:19)    CENTRAL LINE: [ ] YES [x ] NO      MERIDA: [ ] YES [x ] NO      A-LINE:  [ ] YES [x ] NO        ICU Vital Signs Last 24 Hrs  T(C): 36.6 (06 Feb 2024 04:00), Max: 37 (05 Feb 2024 08:00)  T(F): 97.8 (06 Feb 2024 04:00), Max: 98.6 (05 Feb 2024 08:00)  HR: 91 (06 Feb 2024 07:00) (87 - 118)  BP: 113/67 (06 Feb 2024 07:00) (96/64 - 139/67)  BP(mean): 79 (06 Feb 2024 07:00) (70 - 86)  ABP: --  ABP(mean): --  RR: 18 (06 Feb 2024 07:00) (10 - 21)  SpO2: 100% (06 Feb 2024 07:00) (97% - 100%)    O2 Parameters below as of 06 Feb 2024 06:00  Patient On (Oxygen Delivery Method): room air        02-05 @ 07:01  -  02-06 @ 07:00  --------------------------------------------------------  IN: 0 mL / OUT: 850 mL / NET: -850 mL      PHYSICAL EXAM:    GENERAL: NAD, well-groomed, well-developed  EYES: EOMI, PERRLA,   NECK: Supple, No JVD; Normal thyroid; Trachea midline  NERVOUS SYSTEM:  Alert & Oriented X3,  Motor Strength 5/5 B/L upper and lower extremities; DTRs 2+ intact and symmetric  CHEST/LUNG: No rales, rhonchi, wheezing   HEART: Regular rate and rhythm; No murmurs,   ABDOMEN: Soft, Nontender, Nondistended; Bowel sounds present  EXTREMITIES:  2+ Peripheral Pulses, No clubbing, cyanosis, or edema      LABS:                        12.5   11.50 )-----------( 215      ( 06 Feb 2024 04:35 )             37.9     02-06    139  |  105  |  10  ----------------------------<  177<H>  4.1   |  23  |  0.62    Ca    9.2      06 Feb 2024 04:35  Phos  3.9     02-06  Mg     2.30     02-06    TPro  7.6  /  Alb  3.8  /  TBili  0.8  /  DBili  x   /  AST  40<H>  /  ALT  38<H>  /  AlkPhos  135<H>  02-06    PTT - ( 06 Feb 2024 04:35 )  PTT:34.4 sec  Urinalysis Basic - ( 06 Feb 2024 04:35 )    Color: x / Appearance: x / SG: x / pH: x  Gluc: 177 mg/dL / Ketone: x  / Bili: x / Urobili: x   Blood: x / Protein: x / Nitrite: x   Leuk Esterase: x / RBC: x / WBC x   Sq Epi: x / Non Sq Epi: x / Bacteria: x      CAPILLARY BLOOD GLUCOSE      POCT Blood Glucose.: 251 mg/dL (05 Feb 2024 23:20)  POCT Blood Glucose.: 158 mg/dL (05 Feb 2024 16:11)  POCT Blood Glucose.: 263 mg/dL (05 Feb 2024 11:33)  POCT Blood Glucose.: 177 mg/dL (05 Feb 2024 07:48)        RADIOLOGY & ADDITIONAL STUDIES REVIEWED:  ***

## 2024-02-06 NOTE — DISCHARGE NOTE NURSING/CASE MANAGEMENT/SOCIAL WORK - NSDCFUADDAPPT_GEN_ALL_CORE_FT
The appointment with cardiology specialist has been made, PLEASE follow up with Dr. Mathew Henriquez on 2/29/2024 at 8:00 AM.     If you would like to follow with Dr. Wallace, kindly call his office at [phone] to schedule an appointment.       Kindly make an appointment with your Endocrinologist at the earliest for adjustment of your Diabetes medications and further management.

## 2024-02-06 NOTE — DISCHARGE NOTE NURSING/CASE MANAGEMENT/SOCIAL WORK - PATIENT PORTAL LINK FT
You can access the FollowMyHealth Patient Portal offered by [facility] by registering at the following website: [email or web address]. By joining [employer]’s FollowMyHealth portal, you will also be able to view your health information using other applications (apps) compatible with our system.

## 2024-02-06 NOTE — PROGRESS NOTE ADULT - ASSESSMENT
Patient is a 58F with PMHx hypothyroidism, DM2, HLD, remote hx nonbleeding peptic ulcers presents with NSTEMI.    Neuro:  AAOx3  Headache - c/w PRN Tylenol     Respiratory:   No active issues  Saturating well on RA     Card  # NSTEMI   -EKG with ST depressions in V3 and V4,interventionalist notified, no need for urgent LHC  -CE/troponin are downtrending  -nitro gtt initiated for chest pain, patient now chest pain free  - s/p heparin gtt, ASA and Brilinta loading dose  - s/p LHC on 2/4 with 99% OM lesion s/p DANN   - will require staged procedure today for stenting of proximal LAD   - TSH normal  - HbA1c is 8.3  -continue DAPT : received loading dose of plavix today to continue daily 75 mg from tomorrow   -continue Lipitor at the hospital and pravastatin once discharged   -ECHO done revealing EF of 45-50% and Hypokinesis of the distal anterolateral and distal inferolateral walls.      GI  History of gastric ulcer  c/w PPI   Dash/CCB diet      No active issues   BUN and SCr WNL       Endo    # DM  Has h/o diabetes on oral hypoglycemic agents at home   HbA1c 8.3  Hold oral hypoglycemic agents  Monitor blood sugars ac and hs  ADMELOG insulin sliding scale  Low carbohydrate diet  Diabetes education  Nutrition consult if needed      #HLD (hyperlipidemia).   Total cholesterol 226, TAGs 302,  and HDL 62  c/w  atorvastatin 40 mg  will continue pravastatin once pt discharged home       # Hypothyroidism.    c/w levothyroxine 75mcg qd      ID  # leukocytosis   Elevated wbc without fever  CXR: neg for consolidations or effusions.   WBC trending down to 11 today from 14  likely reactive 2/2 acute NSTEMI     DVT PPx  heparin drip.

## 2024-02-09 NOTE — CHART NOTE - NSCHARTNOTEFT_GEN_A_CORE
Post-Discharge Medication Review    Patient's preferred pharmacy was updated in [facility]: [facility] on [facility] in [place], [place]    Patient contacted to offer medication counseling post-discharge. Medication reconciliation completed. Per patient, medications include:    1. Aspirin 81 mg oral delayed release tablet 1 tab(s) orally once a day  2. levothyroxine 75 mcg (0.075 mg) oral tablet 1 tab(s) orally once a day  3. MetFORMIN 500 mg oral tablet, extended release 2 tab(s) orally once a day  4. metoprolol succinate 50 mg oral tablet, extended release 1 tab(s) orally once a day  5. Plavix 75 mg oral tablet 1 tab(s) orally once a day  6. pravastatin 40 mg oral tablet 1 tab(s) orally once a day  7. Multivitamins oral capsule 1 capsule orally daily   8. Vitamin B3 2000 IU oral capsule 1 capsule orally daily   9. Biotin 5000 mcg oral capsule 1 capsule orally daily         Medication name, indication, administration, side effect, and monitoring reviewed for new medications post-discharge with patient. Patient demonstrated understanding. Counseling offered for all medications.       Discussed with patient side effects and indication of each new medication. Patient expressed no lightheadedness and dizziness while on new medication regimen. Patient followed up with outpatient provider on 2/29/24 and was advised to continue current medication regimen.

## 2024-02-13 RX ORDER — LEVOTHYROXINE SODIUM 125 MCG
1 TABLET ORAL
Refills: 0 | DISCHARGE

## 2024-02-13 RX ORDER — METFORMIN HYDROCHLORIDE 850 MG/1
2 TABLET ORAL
Refills: 0 | DISCHARGE

## 2024-02-27 DIAGNOSIS — E03.9 HYPOTHYROIDISM, UNSPECIFIED: ICD-10-CM

## 2024-02-27 DIAGNOSIS — K25.9 GASTRIC ULCER, UNSPECIFIED AS ACUTE OR CHRONIC, W/OUT HEMORRHAGE OR PERFORATION: ICD-10-CM

## 2024-02-27 DIAGNOSIS — I25.2 OLD MYOCARDIAL INFARCTION: ICD-10-CM

## 2024-02-27 DIAGNOSIS — E08.9 DIABETES MELLITUS DUE TO UNDERLYING CONDITION W/OUT COMPLICATIONS: ICD-10-CM

## 2024-02-27 RX ORDER — METOPROLOL SUCCINATE 50 MG/1
50 TABLET, EXTENDED RELEASE ORAL DAILY
Qty: 90 | Refills: 3 | Status: ACTIVE | COMMUNITY

## 2024-02-27 RX ORDER — CLOPIDOGREL 75 MG/1
75 TABLET, FILM COATED ORAL DAILY
Qty: 90 | Refills: 0 | Status: ACTIVE | COMMUNITY

## 2024-02-27 RX ORDER — METFORMIN HYDROCHLORIDE 500 MG/1
500 TABLET, COATED ORAL DAILY
Refills: 0 | Status: ACTIVE | COMMUNITY

## 2024-02-27 RX ORDER — PRAVASTATIN SODIUM 40 MG/1
40 TABLET ORAL DAILY
Refills: 0 | Status: ACTIVE | COMMUNITY

## 2024-02-27 RX ORDER — LEVOTHYROXINE SODIUM 0.07 MG/1
75 TABLET ORAL DAILY
Refills: 0 | Status: ACTIVE | COMMUNITY

## 2024-02-29 ENCOUNTER — APPOINTMENT (OUTPATIENT)
Dept: CARDIOLOGY | Facility: CLINIC | Age: 59
End: 2024-02-29

## 2024-03-20 PROBLEM — E78.5 HYPERLIPIDEMIA, UNSPECIFIED: Chronic | Status: ACTIVE | Noted: 2024-02-02

## 2024-03-20 PROBLEM — E03.9 HYPOTHYROIDISM, UNSPECIFIED: Chronic | Status: ACTIVE | Noted: 2024-02-02

## 2024-03-20 PROBLEM — E11.9 TYPE 2 DIABETES MELLITUS WITHOUT COMPLICATIONS: Chronic | Status: ACTIVE | Noted: 2024-02-02

## 2024-03-26 ENCOUNTER — APPOINTMENT (OUTPATIENT)
Dept: CARDIOLOGY | Facility: CLINIC | Age: 59
End: 2024-03-26

## 2024-03-26 ENCOUNTER — NON-APPOINTMENT (OUTPATIENT)
Age: 59
End: 2024-03-26

## 2024-03-26 NOTE — REASON FOR VISIT
[Home] : at home, [unfilled] , at the time of the visit. [Medical Office: ([facility])___] : at the medical office located in  [Verbal consent obtained from patient] : the patient, [unfilled] [Intake Visit] : an intake visit [Assessment] : an assessment [FreeTextEntry1] : ITP to be finalized, reviewed and manually signed by Dr. Sahni prior to session #1; Clinical indication for cardiac rehabilitation: NSTEMI

## 2024-03-26 NOTE — PLAN
[FreeTextEntry1] : Cardiac Rehabilitation Phase 2 Focus assessment and physical exam at session #1 ITP initiated- confer with Dr. Sher  All questions answered.   Start date: 6/27 7:00 AM session Patient will have SRN with Dr. Sher on 6/10 VIRTUAL via telehealth at 11:00 AM If session availability allows, she would like to start CR sooner, however will be traveling to [place] 6/12-6/25.

## 2024-03-26 NOTE — ASSESSMENT
[FreeTextEntry1] : s/p NSTEMI with DANN to OM1 on 2/5/24. Stable and appropriate for CR phase 2 at this time. LAD lesion 60%, being medically managed, follows with Dr. Raman.

## 2024-03-26 NOTE — REASON FOR VISIT
[Home] : at home, [unfilled] , at the time of the visit. [Medical Office: ([facility])___] : at the medical office located in  [Verbal consent obtained from patient] : the patient, [unfilled] [Intake Visit] : an intake visit [FreeTextEntry1] : ITP to be finalized, reviewed and manually signed by Dr. Sahni prior to session #1; Clinical indication for cardiac rehabilitation: NSTEMI  [Assessment] : an assessment

## 2024-03-26 NOTE — HISTORY OF PRESENT ILLNESS
[Fully functional (bathing, dressing, toileting, transferring, walking, feeding)] : Fully functional (bathing, dressing, toileting, transferring, walking, feeding) [Type II Diabetes] : Type II Diabetes [No] : No [Is Patient aware of signs and symptoms of hypoglycemia and hyperglycemia?] : patient is aware of signs and symptoms of hypoglycemia and hyperglycemia [Yes, continue with Diabetes plan] : Yes, continue with Diabetes plan [Individualized exercise program as developed at cardiac rehabilitation] : individualized exercise program as developed at cardiac rehabilitation [FreeTextEntry1] : 8% [Is Patient adherent with medication?] : patient is adherent with medication [Does patient monitor blood pressure at home?] : patient does not monitor blood pressure at home [Low sodium diet] : low sodium diet [Patient will verbalize factors contributing to improved blood pressure management] : Patient will verbalize factors contributing to improved blood pressure management [Medication Adherence] : medication adherence [Halt the salt] : halt the salt [Yes, continue with Hypertension plan] : Yes, continue with Hypertension plan [Height: ___] : Height: [unfilled] [Weight: ___ lbs] : Weight: [unfilled] lbs [Does patient monitor weight at home?] : Does patient monitor weight at home? Yes [Patient will lose 0.5 to 1 lb per week] : Patient will lose 0.5 to 1lb per week [Patient will weigh self daily] : patient will weigh self daily [Patient will lose inches off waist] : patient will lose inches off waist [Monitoring of weight at home and at cardiac rehabilitation] : Monitoring of weight at home and at cardiac rehabilitation [Calories and healthy weight management] : Calories and healthy weight management [Yes, continue with Weight Management plan] : Yes, continue with weight management plan [None] : none [Hypertension] : hypertension [Overweight/Obesity] : overweight/obesity [Sedentary] : sedentary [Treadmill] : treadmill [Recumbent bike] : recumbent bike [BioStep] : BioStep [Elliptical] : elliptical [Upper body ergometer] : upper body ergometer [Stair Climber] : stair climber [Walking] : walking [Will assess for musculoskeletal and other restrictions] : will assess for musculoskeletal and other restrictions [Stretching/ROM exercises and balance exercises daily] : Stretching/ROM exercises and balance exercises daily [To start resistance training] : to start resistance training [To return to my previous hobbies and activities] : to return to my previous hobbies and activities  [Yes, per exercise prescription, policy] : Yes, per exercise prescription, policy [Signs/Symptoms to report] : signs/symptoms to report [de-identified] : Intake: Understands importance of incorporating exercise into her daily routine, looking forward to CR and will start walking around the neighborhood as weather allows.   [Yes, continue with psychosocial plan] : Yes, continue with psychosocial plan [Discuss overview of emotional health supportive modalities] : Discuss overview of emotional health supportive modalities [FreeTextEntry9] : Intake: Patient struggled with anxiety after her event, is feeling better now and has good family support.  [Assessment] : Assessment [Action] : Action [Patient has seen registered dietitian in the past?] : Patient has seen registered dietitian in the past? No [Is patient on medication for hyperlipidemia?] : Is patient on medication for hyperlipidemia? Yes [Patient is interested in seeing a registered dietitian?] : Patient is interested in seeing a registered dietitian? Yes [Discuss an overview of healthy eating plan] : Discuss an overview of healthy eating plan [Patient will include healthy diet pattern approaches to healthy eating] : Patient will include healthy diet pattern approaches to healthy eating [Yes, continue with nutrition plan] : Yes, continue with nutrition plan [In progress] : in progress [FreeTextEntry6] : [place] food [FreeTextEntry8] : Intake: Patient verbalizes good understanding of heart healthy diet, is working to eliminate fat, sugar and sodium. Struggles finding [place] foods that are heart healthy, is also a vegetarian and does not eat eggs.

## 2024-03-26 NOTE — HISTORY OF PRESENT ILLNESS
[Fully functional (bathing, dressing, toileting, transferring, walking, feeding)] : Fully functional (bathing, dressing, toileting, transferring, walking, feeding) [Type II Diabetes] : Type II Diabetes [No] : No [Is Patient aware of signs and symptoms of hypoglycemia and hyperglycemia?] : patient is aware of signs and symptoms of hypoglycemia and hyperglycemia [Individualized exercise program as developed at cardiac rehabilitation] : individualized exercise program as developed at cardiac rehabilitation [Yes, continue with Diabetes plan] : Yes, continue with Diabetes plan [FreeTextEntry1] : 8% [Is Patient adherent with medication?] : patient is adherent with medication [Does patient monitor blood pressure at home?] : patient does not monitor blood pressure at home [Low sodium diet] : low sodium diet [Patient will verbalize factors contributing to improved blood pressure management] : Patient will verbalize factors contributing to improved blood pressure management [Medication Adherence] : medication adherence [Halt the salt] : halt the salt [Yes, continue with Hypertension plan] : Yes, continue with Hypertension plan [Height: ___] : Height: [unfilled] [Weight: ___ lbs] : Weight: [unfilled] lbs [Does patient monitor weight at home?] : Does patient monitor weight at home? Yes [Patient will lose 0.5 to 1 lb per week] : Patient will lose 0.5 to 1lb per week [Patient will lose inches off waist] : patient will lose inches off waist [Patient will weigh self daily] : patient will weigh self daily [Monitoring of weight at home and at cardiac rehabilitation] : Monitoring of weight at home and at cardiac rehabilitation [Calories and healthy weight management] : Calories and healthy weight management [Yes, continue with Weight Management plan] : Yes, continue with weight management plan [None] : none [Sedentary] : sedentary [Overweight/Obesity] : overweight/obesity [Hypertension] : hypertension [Treadmill] : treadmill [BioStep] : BioStep [Recumbent bike] : recumbent bike [Elliptical] : elliptical [Upper body ergometer] : upper body ergometer [Walking] : walking [Stair Climber] : stair climber [Will assess for musculoskeletal and other restrictions] : will assess for musculoskeletal and other restrictions [Stretching/ROM exercises and balance exercises daily] : Stretching/ROM exercises and balance exercises daily [To start resistance training] : to start resistance training [To return to my previous hobbies and activities] : to return to my previous hobbies and activities  [Yes, per exercise prescription, policy] : Yes, per exercise prescription, policy [Signs/Symptoms to report] : signs/symptoms to report [de-identified] : Intake: Understands importance of incorporating exercise into her daily routine, looking forward to CR and will start walking around the neighborhood as weather allows.   [Yes, continue with psychosocial plan] : Yes, continue with psychosocial plan [Discuss overview of emotional health supportive modalities] : Discuss overview of emotional health supportive modalities [FreeTextEntry9] : Intake: Patient struggled with anxiety after her event, is feeling better now and has good family support.  [Action] : Action [Assessment] : Assessment [Patient has seen registered dietitian in the past?] : Patient has seen registered dietitian in the past? No [Is patient on medication for hyperlipidemia?] : Is patient on medication for hyperlipidemia? Yes [Patient is interested in seeing a registered dietitian?] : Patient is interested in seeing a registered dietitian? Yes [Discuss an overview of healthy eating plan] : Discuss an overview of healthy eating plan [Patient will include healthy diet pattern approaches to healthy eating] : Patient will include healthy diet pattern approaches to healthy eating [Yes, continue with nutrition plan] : Yes, continue with nutrition plan [In progress] : in progress [FreeTextEntry6] : [place] food [FreeTextEntry8] : Intake: Patient verbalizes good understanding of heart healthy diet, is working to eliminate fat, sugar and sodium. Struggles finding [place] citizen foods that are heart healthy, is also a vegetarian and does not eat eggs.

## 2024-03-26 NOTE — REVIEW OF SYSTEMS
[Fatigue] : fatigue [Vision Problems] : no vision problems [Leg Claudication] : no leg claudication [Lower Ext Edema] : no lower extremity edema [Shortness Of Breath] : no shortness of breath [Dyspnea on Exertion] : no dyspnea on exertion [Joint Pain] : no joint pain [Joint Stiffness] : no joint stiffness [Muscle Pain] : no muscle pain [Headache] : no headache [Unsteady Walking] : no ataxia [Insomnia] : no insomnia [Anxiety] : no anxiety

## 2024-06-10 ENCOUNTER — APPOINTMENT (OUTPATIENT)
Dept: CARDIOLOGY | Facility: CLINIC | Age: 59
End: 2024-06-10

## 2024-06-26 PROBLEM — E78.5 HYPERLIPIDEMIA: Status: ACTIVE | Noted: 2024-02-27

## 2024-06-26 PROBLEM — R07.9 CHEST PAIN, UNSPECIFIED TYPE: Status: ACTIVE | Noted: 2024-02-27

## 2024-06-27 ENCOUNTER — APPOINTMENT (OUTPATIENT)
Dept: CARDIOLOGY | Facility: CLINIC | Age: 59
End: 2024-06-27
Payer: COMMERCIAL

## 2024-06-27 DIAGNOSIS — R07.9 CHEST PAIN, UNSPECIFIED: ICD-10-CM

## 2024-06-27 DIAGNOSIS — E78.5 HYPERLIPIDEMIA, UNSPECIFIED: ICD-10-CM

## 2024-06-27 PROCEDURE — 99214 OFFICE O/P EST MOD 30 MIN: CPT

## 2024-06-27 PROCEDURE — G2211 COMPLEX E/M VISIT ADD ON: CPT

## 2024-07-01 ENCOUNTER — APPOINTMENT (OUTPATIENT)
Dept: CARDIOLOGY | Facility: CLINIC | Age: 59
End: 2024-07-01
Payer: COMMERCIAL

## 2024-07-01 PROCEDURE — 93798 PHYS/QHP OP CAR RHAB W/ECG: CPT

## 2024-07-03 ENCOUNTER — APPOINTMENT (OUTPATIENT)
Dept: CARDIOLOGY | Facility: CLINIC | Age: 59
End: 2024-07-03
Payer: COMMERCIAL

## 2024-07-03 PROCEDURE — 93797 PHYS/QHP OP CAR RHAB WO ECG: CPT

## 2024-07-08 ENCOUNTER — APPOINTMENT (OUTPATIENT)
Dept: CARDIOLOGY | Facility: CLINIC | Age: 59
End: 2024-07-08
Payer: COMMERCIAL

## 2024-07-08 PROCEDURE — 93797 PHYS/QHP OP CAR RHAB WO ECG: CPT

## 2024-07-10 ENCOUNTER — APPOINTMENT (OUTPATIENT)
Dept: CARDIOLOGY | Facility: CLINIC | Age: 59
End: 2024-07-10
Payer: COMMERCIAL

## 2024-07-10 PROCEDURE — 93797 PHYS/QHP OP CAR RHAB WO ECG: CPT

## 2024-07-11 ENCOUNTER — APPOINTMENT (OUTPATIENT)
Dept: CARDIOLOGY | Facility: CLINIC | Age: 59
End: 2024-07-11
Payer: COMMERCIAL

## 2024-07-11 PROCEDURE — 93797 PHYS/QHP OP CAR RHAB WO ECG: CPT

## 2024-07-24 ENCOUNTER — APPOINTMENT (OUTPATIENT)
Dept: CARDIOLOGY | Facility: CLINIC | Age: 59
End: 2024-07-24
Payer: COMMERCIAL

## 2024-07-24 PROCEDURE — 93797 PHYS/QHP OP CAR RHAB WO ECG: CPT

## 2024-07-25 ENCOUNTER — APPOINTMENT (OUTPATIENT)
Dept: CARDIOLOGY | Facility: CLINIC | Age: 59
End: 2024-07-25
Payer: COMMERCIAL

## 2024-07-25 PROCEDURE — 93797 PHYS/QHP OP CAR RHAB WO ECG: CPT

## 2024-07-29 ENCOUNTER — APPOINTMENT (OUTPATIENT)
Dept: CARDIOLOGY | Facility: CLINIC | Age: 59
End: 2024-07-29
Payer: COMMERCIAL

## 2024-07-29 PROCEDURE — 93797 PHYS/QHP OP CAR RHAB WO ECG: CPT

## 2024-07-31 ENCOUNTER — APPOINTMENT (OUTPATIENT)
Dept: CARDIOLOGY | Facility: CLINIC | Age: 59
End: 2024-07-31
Payer: COMMERCIAL

## 2024-07-31 PROCEDURE — 93797 PHYS/QHP OP CAR RHAB WO ECG: CPT

## 2024-08-05 ENCOUNTER — APPOINTMENT (OUTPATIENT)
Dept: CARDIOLOGY | Facility: CLINIC | Age: 59
End: 2024-08-05

## 2024-08-05 PROCEDURE — 93797 PHYS/QHP OP CAR RHAB WO ECG: CPT

## 2024-08-07 ENCOUNTER — APPOINTMENT (OUTPATIENT)
Dept: CARDIOLOGY | Facility: CLINIC | Age: 59
End: 2024-08-07

## 2024-08-07 PROCEDURE — 93797 PHYS/QHP OP CAR RHAB WO ECG: CPT

## 2024-08-08 ENCOUNTER — APPOINTMENT (OUTPATIENT)
Dept: CARDIOLOGY | Facility: CLINIC | Age: 59
End: 2024-08-08

## 2024-08-08 PROCEDURE — 93797 PHYS/QHP OP CAR RHAB WO ECG: CPT

## 2024-08-08 NOTE — HISTORY OF PRESENT ILLNESS
[Type II Diabetes] : Type II Diabetes [No] : No [Is Patient aware of signs and symptoms of hypoglycemia and hyperglycemia?] : patient is aware of signs and symptoms of hypoglycemia and hyperglycemia [Individualized exercise program as developed at cardiac rehabilitation] : individualized exercise program as developed at cardiac rehabilitation [Overview of diabetes and cardiovascular disease] : overview of diabetes and cardiovascular disease [Hypoglycemia and Hyperglycemia: sign and symptoms] : Hypoglycemia and Hyperglycemia: sign and symptoms [Yes, continue with Diabetes plan] : Yes, continue with Diabetes plan [Is Patient adherent with medication?] : patient is adherent with medication [Low sodium diet] : low sodium diet [Patient will verbalize factors contributing to improved blood pressure management] : Patient will verbalize factors contributing to improved blood pressure management [Medication Adherence] : medication adherence [Guidelines for blood pressure management] : guidelines for blood pressure management [Define hypertension] : define hypertension [Exercise and blood pressure] : exercise and blood pressure [Processed food] : processed food [Yes, continue with Hypertension plan] : Yes, continue with Hypertension plan [Height: ___] : Height: [unfilled] [Weight: ___ lbs] : Weight: [unfilled] lbs [Does patient monitor weight at home?] : Does patient monitor weight at home? Yes [Patient will lose 0.5 to 1 lb per week] : Patient will lose 0.5 to 1lb per week [Patient will lose inches off waist] : patient will lose inches off waist [Patient will weigh self daily] : patient will weigh self daily [Monitoring of weight at home and at cardiac rehabilitation] : Monitoring of weight at home and at cardiac rehabilitation [Calories and healthy weight management] : Calories and healthy weight management [Role of lifestyle behaviors in weight management] : Role of lifestyle behaviors in weight management [Discharge instructions as per guidelines] : discharge instructions as per guidelines [Yes, continue with Weight Management plan] : Yes, continue with weight management plan [None] : none [Hypertension] : hypertension [Sedentary] : sedentary [Overweight/Obesity] : overweight/obesity [BP: ____ mmHg] : BP: [unfilled] mmHg [HR: ____ bpm] : BP: [unfilled] bpm [O2sat: ____ %] : O2sat: [unfilled] % [RPE: ____] : RPE: [unfilled]  [METS: ____] : METS: [unfilled]  [Cardiac Rehabilitation] : Cardiac Rehabilitation [___ Days per week] : [unfilled] days per week [>= 31 minutes per session] : >= 31 minutes per session [Target RPE: ___] : Target RPE: [unfilled] [Treadmill] : treadmill [Recumbent bike] : recumbent bike [BioStep] : BioStep [Elliptical] : elliptical [Upper body ergometer] : upper body ergometer [Stair Climber] : stair climber [Walking] : walking [Stretching/ROM exercises and balance exercises daily] : Stretching/ROM exercises and balance exercises daily [Will assess for musculoskeletal and other restrictions] : will assess for musculoskeletal and other restrictions [To start resistance training] : to start resistance training [To return to my previous hobbies and activities] : to return to my previous hobbies and activities  [Equipment Orientation/Safety] : equipment orientation/safety [Exercise Benefits/Guidelines education] : exercise benefits/guidelines education [Individualized Exercise Rx] : individualized exercise Rx [Signs/Symptoms to report] : signs/symptoms to report [Hemodynamic responses] : hemodynamic responses [Home exercise] : home exercise [Patient verbalizes understanding of exercise education all questions answered] : Patient verbalizes understanding of exercise education all questions answered [Return demonstration] : return demonstration [Yes, per exercise prescription, policy] : Yes, per exercise prescription, policy [PHQ-9: ___] : PHQ-9: [unfilled] [[facility]: ___] : [facility]: [unfilled] [Self-reports of improved psychosocial well-being] : Self-reports of improved psychosocial well-being [Discuss overview of emotional health supportive modalities] : Discuss overview of emotional health supportive modalities [Yes, continue with psychosocial plan] : Yes, continue with psychosocial plan [Action] : Action [Is patient on medication for hyperlipidemia?] : Is patient on medication for hyperlipidemia? Yes [Patient is interested in seeing a registered dietitian?] : Patient is interested in seeing a registered dietitian? Yes [Patient will include healthy diet pattern approaches to healthy eating] : Patient will include healthy diet pattern approaches to healthy eating [Discuss an overview of healthy eating plan] : Discuss an overview of healthy eating plan [MyPlate.gov] : MyPlate.gov [Yes, continue with nutrition plan] : Yes, continue with nutrition plan [In progress] : in progress [Diagnosis of prediabetes, diabetes] : diagnosis of prediabetes, diabetes [Role of lifestyle behaviors in diabetes management] : role of lifestyle behaviors in diabetes management [Does patient monitor blood pressure at home?] : patient does not monitor blood pressure at home [Teach back utilized] : teach back utilized [PCI/Stent] : PCI/stent [Fall Risk] : no fall risk [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   7/1/24: METS: RB 3.2; TM 2.8- tolerated well 8/5/24: session #10- patient tolerating progression well, had mild jaw pain on biostep this past month- mild, no associated symptoms, team reached out to Dr. Raman who stated patient okay for exercise, had no recurrence at subsequent sessions- METS 4.1 biostep; 3.8 treadmill; patient states she is not exercising outside of cardiac rehab, states she is a "workaholic,"  and would not be able to find time for a gym. She is interested in a walking video "[employer]." [Assessment] : Assessment [Mindfulness] : mindfulness [Relaxation breathing techniques] : relaxation breathing techniques [Stress management] : stress management [FreeTextEntry9] : Intake: Patient struggled with anxiety after her event, is feeling better now and has good family support.  8/5/24: Patient reports that she is busy with work, has some jaw pain which she attributes to not wearing her mouth guard for teeth clenching/TMJ [Reassessment] : Reassessment [Patient has seen registered dietitian in the past?] : Patient has seen registered dietitian in the past? No [CareNotes written material provided] : CareNotes written material provided [FreeTextEntry6] : [place] food [FreeTextEntry8] : Intake: Patient verbalizes good understanding of heart healthy diet, is working to eliminate fat, sugar and sodium. Struggles finding [place] foods that are heart healthy, is also a vegetarian and does not eat eggs.   8/5/24: Patient recognizes that she does not eat heart healthfully, adding she does not cook. She is considering looking into vegetarian foods that are available for delivery, such as [employer] or [employer]. [FreeTextEntry7] : RD contact information provided.

## 2024-08-08 NOTE — HISTORY OF PRESENT ILLNESS
[Type II Diabetes] : Type II Diabetes [No] : No [Is Patient aware of signs and symptoms of hypoglycemia and hyperglycemia?] : patient is aware of signs and symptoms of hypoglycemia and hyperglycemia [Individualized exercise program as developed at cardiac rehabilitation] : individualized exercise program as developed at cardiac rehabilitation [Overview of diabetes and cardiovascular disease] : overview of diabetes and cardiovascular disease [Hypoglycemia and Hyperglycemia: sign and symptoms] : Hypoglycemia and Hyperglycemia: sign and symptoms [Yes, continue with Diabetes plan] : Yes, continue with Diabetes plan [Is Patient adherent with medication?] : patient is adherent with medication [Low sodium diet] : low sodium diet [Patient will verbalize factors contributing to improved blood pressure management] : Patient will verbalize factors contributing to improved blood pressure management [Medication Adherence] : medication adherence [Guidelines for blood pressure management] : guidelines for blood pressure management [Define hypertension] : define hypertension [Exercise and blood pressure] : exercise and blood pressure [Processed food] : processed food [Yes, continue with Hypertension plan] : Yes, continue with Hypertension plan [Height: ___] : Height: [unfilled] [Weight: ___ lbs] : Weight: [unfilled] lbs [Does patient monitor weight at home?] : Does patient monitor weight at home? Yes [Patient will lose 0.5 to 1 lb per week] : Patient will lose 0.5 to 1lb per week [Patient will lose inches off waist] : patient will lose inches off waist [Patient will weigh self daily] : patient will weigh self daily [Monitoring of weight at home and at cardiac rehabilitation] : Monitoring of weight at home and at cardiac rehabilitation [Calories and healthy weight management] : Calories and healthy weight management [Role of lifestyle behaviors in weight management] : Role of lifestyle behaviors in weight management [Discharge instructions as per guidelines] : discharge instructions as per guidelines [Yes, continue with Weight Management plan] : Yes, continue with weight management plan [None] : none [Hypertension] : hypertension [Sedentary] : sedentary [Overweight/Obesity] : overweight/obesity [BP: ____ mmHg] : BP: [unfilled] mmHg [HR: ____ bpm] : BP: [unfilled] bpm [O2sat: ____ %] : O2sat: [unfilled] % [RPE: ____] : RPE: [unfilled]  [METS: ____] : METS: [unfilled]  [Cardiac Rehabilitation] : Cardiac Rehabilitation [___ Days per week] : [unfilled] days per week [>= 31 minutes per session] : >= 31 minutes per session [Target RPE: ___] : Target RPE: [unfilled] [Treadmill] : treadmill [Recumbent bike] : recumbent bike [BioStep] : BioStep [Elliptical] : elliptical [Upper body ergometer] : upper body ergometer [Stair Climber] : stair climber [Walking] : walking [Stretching/ROM exercises and balance exercises daily] : Stretching/ROM exercises and balance exercises daily [Will assess for musculoskeletal and other restrictions] : will assess for musculoskeletal and other restrictions [To start resistance training] : to start resistance training [To return to my previous hobbies and activities] : to return to my previous hobbies and activities  [Equipment Orientation/Safety] : equipment orientation/safety [Exercise Benefits/Guidelines education] : exercise benefits/guidelines education [Individualized Exercise Rx] : individualized exercise Rx [Signs/Symptoms to report] : signs/symptoms to report [Hemodynamic responses] : hemodynamic responses [Home exercise] : home exercise [Patient verbalizes understanding of exercise education all questions answered] : Patient verbalizes understanding of exercise education all questions answered [Return demonstration] : return demonstration [Yes, per exercise prescription, policy] : Yes, per exercise prescription, policy [PHQ-9: ___] : PHQ-9: [unfilled] [[facility]: ___] : [facility]: [unfilled] [Self-reports of improved psychosocial well-being] : Self-reports of improved psychosocial well-being [Discuss overview of emotional health supportive modalities] : Discuss overview of emotional health supportive modalities [Yes, continue with psychosocial plan] : Yes, continue with psychosocial plan [Action] : Action [Is patient on medication for hyperlipidemia?] : Is patient on medication for hyperlipidemia? Yes [Patient is interested in seeing a registered dietitian?] : Patient is interested in seeing a registered dietitian? Yes [Patient will include healthy diet pattern approaches to healthy eating] : Patient will include healthy diet pattern approaches to healthy eating [Discuss an overview of healthy eating plan] : Discuss an overview of healthy eating plan [MyPlate.gov] : MyPlate.gov [Yes, continue with nutrition plan] : Yes, continue with nutrition plan [In progress] : in progress [Diagnosis of prediabetes, diabetes] : diagnosis of prediabetes, diabetes [Role of lifestyle behaviors in diabetes management] : role of lifestyle behaviors in diabetes management [Does patient monitor blood pressure at home?] : patient does not monitor blood pressure at home [Teach back utilized] : teach back utilized [PCI/Stent] : PCI/stent [Fall Risk] : no fall risk [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   7/1/24: METS: RB 3.2; TM 2.8- tolerated well 8/5/24: session #10- patient tolerating progression well, had mild jaw pain on biostep this past month- mild, no associated symptoms, team reached out to Dr. Raman who stated patient okay for exercise, had no recurrence at subsequent sessions- METS 4.1 biostep; 3.8 treadmill; patient states she is not exercising outside of cardiac rehab, states she is a "workaholic,"  and would not be able to find time for a gym. She is interested in a walking video "[employer]." [Assessment] : Assessment [Mindfulness] : mindfulness [Relaxation breathing techniques] : relaxation breathing techniques [Stress management] : stress management [FreeTextEntry9] : Intake: Patient struggled with anxiety after her event, is feeling better now and has good family support.  8/5/24: Patient reports that she is busy with work, has some jaw pain which she attributes to not wearing her mouth guard for teeth clenching/TMJ [Reassessment] : Reassessment [Patient has seen registered dietitian in the past?] : Patient has seen registered dietitian in the past? No [CareNotes written material provided] : CareNotes written material provided [FreeTextEntry6] : [place] food [FreeTextEntry8] : Intake: Patient verbalizes good understanding of heart healthy diet, is working to eliminate fat, sugar and sodium. Struggles finding [place] foods that are heart healthy, is also a vegetarian and does not eat eggs.   8/5/24: Patient recognizes that she does not eat heart healthfully, adding she does not cook. She is considering looking into vegetarian foods that are available for delivery, such as [employer] or [employer]. [FreeTextEntry7] : RD contact information provided.

## 2024-08-08 NOTE — REASON FOR VISIT
[Reassessment: _______] : a reassessment for [unfilled] [FreeTextEntry1] : ITP  finalized/established,by Dr. Sahni; subsequent ITPs to be manually signed by Dr. Sahni; Clinical indication for cardiac rehabilitation: NSTEMI

## 2024-08-12 ENCOUNTER — APPOINTMENT (OUTPATIENT)
Dept: CARDIOLOGY | Facility: CLINIC | Age: 59
End: 2024-08-12
Payer: COMMERCIAL

## 2024-08-12 PROCEDURE — 93797 PHYS/QHP OP CAR RHAB WO ECG: CPT

## 2024-08-15 ENCOUNTER — APPOINTMENT (OUTPATIENT)
Dept: CARDIOLOGY | Facility: CLINIC | Age: 59
End: 2024-08-15
Payer: COMMERCIAL

## 2024-08-15 PROCEDURE — 93797 PHYS/QHP OP CAR RHAB WO ECG: CPT

## 2024-08-19 ENCOUNTER — APPOINTMENT (OUTPATIENT)
Dept: CARDIOLOGY | Facility: CLINIC | Age: 59
End: 2024-08-19
Payer: COMMERCIAL

## 2024-08-19 PROCEDURE — 93797 PHYS/QHP OP CAR RHAB WO ECG: CPT

## 2024-08-21 ENCOUNTER — APPOINTMENT (OUTPATIENT)
Dept: CARDIOLOGY | Facility: CLINIC | Age: 59
End: 2024-08-21
Payer: COMMERCIAL

## 2024-08-21 PROCEDURE — 93797 PHYS/QHP OP CAR RHAB WO ECG: CPT

## 2024-08-28 ENCOUNTER — APPOINTMENT (OUTPATIENT)
Dept: CARDIOLOGY | Facility: CLINIC | Age: 59
End: 2024-08-28

## 2024-08-29 ENCOUNTER — APPOINTMENT (OUTPATIENT)
Dept: CARDIOLOGY | Facility: CLINIC | Age: 59
End: 2024-08-29

## 2024-08-30 ENCOUNTER — NON-APPOINTMENT (OUTPATIENT)
Age: 59
End: 2024-08-30

## 2024-09-02 NOTE — HISTORY OF PRESENT ILLNESS
[Type II Diabetes] : Type II Diabetes [No] : No [Is Patient aware of signs and symptoms of hypoglycemia and hyperglycemia?] : patient is aware of signs and symptoms of hypoglycemia and hyperglycemia [Individualized exercise program as developed at cardiac rehabilitation] : individualized exercise program as developed at cardiac rehabilitation [Overview of diabetes and cardiovascular disease] : overview of diabetes and cardiovascular disease [Diagnosis of prediabetes, diabetes] : diagnosis of prediabetes, diabetes [Hypoglycemia and Hyperglycemia: sign and symptoms] : Hypoglycemia and Hyperglycemia: sign and symptoms [Role of lifestyle behaviors in diabetes management] : role of lifestyle behaviors in diabetes management [Yes, continue with Diabetes plan] : Yes, continue with Diabetes plan [Is Patient adherent with medication?] : patient is adherent with medication [Low sodium diet] : low sodium diet [Patient will verbalize factors contributing to improved blood pressure management] : Patient will verbalize factors contributing to improved blood pressure management [Medication Adherence] : medication adherence [Guidelines for blood pressure management] : guidelines for blood pressure management [Define hypertension] : define hypertension [Role of lifestyle behaviors in blood pressure management] : role of lifestyle behaviors in blood pressure management [Exercise and blood pressure] : exercise and blood pressure [Factors affecting blood pressure] : factors affecting blood pressure [Sodium] : sodium [Processed food] : processed food [CareNotes written materials provided] : CareNotes written materials provided [Yes, continue with Hypertension plan] : Yes, continue with Hypertension plan [Height: ___] : Height: [unfilled] [Weight: ___ lbs] : Weight: [unfilled] lbs [Does patient monitor weight at home?] : Does patient monitor weight at home? Yes [Patient will lose 0.5 to 1 lb per week] : Patient will lose 0.5 to 1lb per week [Patient will lose inches off waist] : patient will lose inches off waist [Patient will weigh self daily] : patient will weigh self daily [Monitoring of weight at home and at cardiac rehabilitation] : Monitoring of weight at home and at cardiac rehabilitation [Calories and healthy weight management] : Calories and healthy weight management [Role of lifestyle behaviors in weight management] : Role of lifestyle behaviors in weight management [Yes, continue with Weight Management plan] : Yes, continue with weight management plan [None] : none [PCI/Stent] : PCI/stent [Hypertension] : hypertension [Sedentary] : sedentary [Overweight/Obesity] : overweight/obesity [BP: ____ mmHg] : BP: [unfilled] mmHg [HR: ____ bpm] : BP: [unfilled] bpm [O2sat: ____ %] : O2sat: [unfilled] % [RPE: ____] : RPE: [unfilled]  [METS: ____] : METS: [unfilled]  [Cardiac Rehabilitation] : Cardiac Rehabilitation [___ Days per week] : [unfilled] days per week [>= 31 minutes per session] : >= 31 minutes per session [Target RPE: ___] : Target RPE: [unfilled] [Treadmill] : treadmill [Recumbent bike] : recumbent bike [BioStep] : BioStep [Elliptical] : elliptical [Upper body ergometer] : upper body ergometer [Stair Climber] : stair climber [Walking] : walking [Stretching/ROM exercises and balance exercises daily] : Stretching/ROM exercises and balance exercises daily [Will assess for musculoskeletal and other restrictions] : will assess for musculoskeletal and other restrictions [To start resistance training] : to start resistance training [To return to my previous hobbies and activities] : to return to my previous hobbies and activities  [Exercise Benefits/Guidelines education] : exercise benefits/guidelines education [Individualized Exercise Rx] : individualized exercise Rx [Signs/Symptoms to report] : signs/symptoms to report [Hemodynamic responses] : hemodynamic responses [Home exercise] : home exercise [Patient verbalizes understanding of exercise education all questions answered] : Patient verbalizes understanding of exercise education all questions answered [Return demonstration] : return demonstration [Yes, per exercise prescription, policy] : Yes, per exercise prescription, policy [Assessment] : Assessment [PHQ-9: ___] : PHQ-9: [unfilled] [[facility] Score Total: ___] : [facility] score total: [unfilled] [Self-reports of improved psychosocial well-being] : Self-reports of improved psychosocial well-being [Discuss overview of emotional health supportive modalities] : Discuss overview of emotional health supportive modalities [Mindfulness] : mindfulness [Relaxation breathing techniques] : relaxation breathing techniques [Stress management] : stress management [Yes, continue with psychosocial plan] : Yes, continue with psychosocial plan [Reassessment] : Reassessment [Action] : Action [Rate your plate score: ____] : Rate your plate score: [unfilled] [Is patient on medication for hyperlipidemia?] : Is patient on medication for hyperlipidemia? Yes [Patient is interested in seeing a registered dietitian?] : Patient is interested in seeing a registered dietitian? Yes [Patient will include healthy diet pattern approaches to healthy eating] : Patient will include healthy diet pattern approaches to healthy eating [Discuss an overview of healthy eating plan] : Discuss an overview of healthy eating plan [MyPlate.gov] : MyPlate.gov [CareNotes written material provided] : CareNotes written material provided [Teach back utilized] : teach back utilized [Yes, continue with nutrition plan] : Yes, continue with nutrition plan [In progress] : in progress [Does patient monitor blood pressure at home?] : patient does not monitor blood pressure at home [Fall Risk] : no fall risk [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   7/1/24: METS: RB 3.2; TM 2.8- tolerated well 7/31/24: session #9- patient tolerating progression well, had mild jaw pain on biostep this past month- mild, no associated symptoms, team reached out to Dr. Raman who stated patient okay for exercise, had no recurrence at subsequent sessions- METS 4.1 biostep; 3.8 treadmill; patient states she is not exercising outside of cardiac rehab, states she is a "workaholic,"  and would not be able to find time for a gym. She is interested in a walking video "[id number]." 8/30/24: Patient last attended 8/21/24, has called absent from CR due to reported RLE discomfort; patient had been tolerating exercise progression well, denies chest or jaw pain; right foot pain at 5 METS, decreased incline after 6 minutes [de-identified] : education: [facility] [FreeTextEntry9] : Intake: Patient struggl8/30/24: Patient red with anxiety after her event, is feeling better now and has good family support.  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 No [FreeTextEntry6] : [place] food [FreeTextEntry8] : Intake: Patient verbalizes good understanding of heart healthy diet, is working to eliminate fat, sugar and sodium. Struggles finding  foods that are heart healthy, is also a vegetarian and does not eat eggs.  7/31/24: Patient recognizes that she does not eat heart healthfully, adding she does not cook. She is considering looking into vegetarian foods that are available for delivery, such as [employer] or [employer]. 8/30/24: Registered dietitian contact number provided to patient; she acknowledges that she eats sweets, has difficulties following a heart healthy diabetic eating pattern; she plans to see a new endocrinologist as her Metformin dosage was recently decreased.  [FreeTextEntry7] : RD contact information provided.

## 2024-09-02 NOTE — HISTORY OF PRESENT ILLNESS
[Type II Diabetes] : Type II Diabetes [No] : No [Is Patient aware of signs and symptoms of hypoglycemia and hyperglycemia?] : patient is aware of signs and symptoms of hypoglycemia and hyperglycemia [Individualized exercise program as developed at cardiac rehabilitation] : individualized exercise program as developed at cardiac rehabilitation [Overview of diabetes and cardiovascular disease] : overview of diabetes and cardiovascular disease [Diagnosis of prediabetes, diabetes] : diagnosis of prediabetes, diabetes [Hypoglycemia and Hyperglycemia: sign and symptoms] : Hypoglycemia and Hyperglycemia: sign and symptoms [Role of lifestyle behaviors in diabetes management] : role of lifestyle behaviors in diabetes management [Yes, continue with Diabetes plan] : Yes, continue with Diabetes plan [Is Patient adherent with medication?] : patient is adherent with medication [Low sodium diet] : low sodium diet [Patient will verbalize factors contributing to improved blood pressure management] : Patient will verbalize factors contributing to improved blood pressure management [Medication Adherence] : medication adherence [Guidelines for blood pressure management] : guidelines for blood pressure management [Define hypertension] : define hypertension [Role of lifestyle behaviors in blood pressure management] : role of lifestyle behaviors in blood pressure management [Exercise and blood pressure] : exercise and blood pressure [Factors affecting blood pressure] : factors affecting blood pressure [Sodium] : sodium [Processed food] : processed food [CareNotes written materials provided] : CareNotes written materials provided [Yes, continue with Hypertension plan] : Yes, continue with Hypertension plan [Height: ___] : Height: [unfilled] [Weight: ___ lbs] : Weight: [unfilled] lbs [Does patient monitor weight at home?] : Does patient monitor weight at home? Yes [Patient will lose 0.5 to 1 lb per week] : Patient will lose 0.5 to 1lb per week [Patient will lose inches off waist] : patient will lose inches off waist [Patient will weigh self daily] : patient will weigh self daily [Monitoring of weight at home and at cardiac rehabilitation] : Monitoring of weight at home and at cardiac rehabilitation [Calories and healthy weight management] : Calories and healthy weight management [Role of lifestyle behaviors in weight management] : Role of lifestyle behaviors in weight management [Yes, continue with Weight Management plan] : Yes, continue with weight management plan [None] : none [PCI/Stent] : PCI/stent [Hypertension] : hypertension [Sedentary] : sedentary [Overweight/Obesity] : overweight/obesity [BP: ____ mmHg] : BP: [unfilled] mmHg [HR: ____ bpm] : BP: [unfilled] bpm [O2sat: ____ %] : O2sat: [unfilled] % [RPE: ____] : RPE: [unfilled]  [METS: ____] : METS: [unfilled]  [Cardiac Rehabilitation] : Cardiac Rehabilitation [___ Days per week] : [unfilled] days per week [>= 31 minutes per session] : >= 31 minutes per session [Target RPE: ___] : Target RPE: [unfilled] [Treadmill] : treadmill [Recumbent bike] : recumbent bike [BioStep] : BioStep [Elliptical] : elliptical [Upper body ergometer] : upper body ergometer [Stair Climber] : stair climber [Walking] : walking [Stretching/ROM exercises and balance exercises daily] : Stretching/ROM exercises and balance exercises daily [Will assess for musculoskeletal and other restrictions] : will assess for musculoskeletal and other restrictions [To start resistance training] : to start resistance training [To return to my previous hobbies and activities] : to return to my previous hobbies and activities  [Exercise Benefits/Guidelines education] : exercise benefits/guidelines education [Individualized Exercise Rx] : individualized exercise Rx [Signs/Symptoms to report] : signs/symptoms to report [Hemodynamic responses] : hemodynamic responses [Home exercise] : home exercise [Patient verbalizes understanding of exercise education all questions answered] : Patient verbalizes understanding of exercise education all questions answered [Return demonstration] : return demonstration [Yes, per exercise prescription, policy] : Yes, per exercise prescription, policy [Assessment] : Assessment [PHQ-9: ___] : PHQ-9: [unfilled] [[facility] Score Total: ___] : [facility] CO[facility] score total: [unfilled] [Self-reports of improved psychosocial well-being] : Self-reports of improved psychosocial well-being [Discuss overview of emotional health supportive modalities] : Discuss overview of emotional health supportive modalities [Mindfulness] : mindfulness [Relaxation breathing techniques] : relaxation breathing techniques [Stress management] : stress management [Yes, continue with psychosocial plan] : Yes, continue with psychosocial plan [Reassessment] : Reassessment [Action] : Action [Rate your plate score: ____] : Rate your plate score: [unfilled] [Is patient on medication for hyperlipidemia?] : Is patient on medication for hyperlipidemia? Yes [Patient is interested in seeing a registered dietitian?] : Patient is interested in seeing a registered dietitian? Yes [Patient will include healthy diet pattern approaches to healthy eating] : Patient will include healthy diet pattern approaches to healthy eating [Discuss an overview of healthy eating plan] : Discuss an overview of healthy eating plan [MyPlate[employer]gov] : MyPlate[employer]gov [CareNotes written material provided] : CareNotes written material provided [Teach back utilized] : teach back utilized [Yes, continue with nutrition plan] : Yes, continue with nutrition plan [In progress] : in progress [Does patient monitor blood pressure at home?] : patient does not monitor blood pressure at home [Fall Risk] : no fall risk [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   7/1/24: METS: RB 3.2; TM 2.8- tolerated well 7/31/24: session #9- patient tolerating progression well, had mild jaw pain on biostep this past month- mild, no associated symptoms, team reached out to Dr. Raman who stated patient okay for exercise, had no recurrence at subsequent sessions- METS 4.1 biostep; 3.8 treadmill; patient states she is not exercising outside of cardiac rehab, states she is a "workaholic,"  and would not be able to find time for a gym. She is interested in a walking video "[id number]." 8/30/24: Patient last attended 8/21/24, has called absent from CR due to reported RLE discomfort; patient had been tolerating exercise progression well, denies chest or jaw pain; right foot pain at 5 METS, decreased incline after 6 minutes [de-identified] : education: [facility] [FreeTextEntry9] : Intake: Patient struggl8/30/24: Patient red with anxiety after her event, is feeling better now and has good family support.  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 No [FreeTextEntry6] : [place] [employer] [FreeTextEntry8] : Intake: Patient verbalizes good understanding of heart healthy diet, is working to eliminate fat, sugar and sodium. Struggles finding  foods that are heart healthy, is also a vegetarian and does not eat eggs.  7/31/24: Patient recognizes that she does not eat heart healthfully, adding she does not cook. She is considering looking into vegetarian foods that are available for delivery, such as [employer] or [employer]. 8/30/24: Registered dietitian contact number provided to patient; she acknowledges that she eats sweets, has difficulties following a heart healthy diabetic eating pattern; she plans to see a new endocrinologist as her Metformin dosage was recently decreased.  [FreeTextEntry7] : RD contact information provided.

## 2024-09-04 ENCOUNTER — APPOINTMENT (OUTPATIENT)
Dept: CARDIOLOGY | Facility: CLINIC | Age: 59
End: 2024-09-04

## 2024-09-05 ENCOUNTER — APPOINTMENT (OUTPATIENT)
Dept: CARDIOLOGY | Facility: CLINIC | Age: 59
End: 2024-09-05

## 2024-09-09 ENCOUNTER — APPOINTMENT (OUTPATIENT)
Dept: CARDIOLOGY | Facility: CLINIC | Age: 59
End: 2024-09-09
Payer: COMMERCIAL

## 2024-09-09 PROCEDURE — 93797 PHYS/QHP OP CAR RHAB WO ECG: CPT

## 2024-09-11 ENCOUNTER — APPOINTMENT (OUTPATIENT)
Dept: CARDIOLOGY | Facility: CLINIC | Age: 59
End: 2024-09-11
Payer: COMMERCIAL

## 2024-09-11 PROCEDURE — 93797 PHYS/QHP OP CAR RHAB WO ECG: CPT

## 2024-09-12 ENCOUNTER — APPOINTMENT (OUTPATIENT)
Dept: CARDIOLOGY | Facility: CLINIC | Age: 59
End: 2024-09-12
Payer: COMMERCIAL

## 2024-09-12 PROCEDURE — 93797 PHYS/QHP OP CAR RHAB WO ECG: CPT

## 2024-09-16 ENCOUNTER — APPOINTMENT (OUTPATIENT)
Dept: CARDIOLOGY | Facility: CLINIC | Age: 59
End: 2024-09-16
Payer: COMMERCIAL

## 2024-09-16 PROCEDURE — 93797 PHYS/QHP OP CAR RHAB WO ECG: CPT

## 2024-09-20 ENCOUNTER — NON-APPOINTMENT (OUTPATIENT)
Age: 59
End: 2024-09-20

## 2024-09-23 ENCOUNTER — APPOINTMENT (OUTPATIENT)
Dept: CARDIOLOGY | Facility: CLINIC | Age: 59
End: 2024-09-23

## 2024-09-25 ENCOUNTER — APPOINTMENT (OUTPATIENT)
Dept: CARDIOLOGY | Facility: CLINIC | Age: 59
End: 2024-09-25

## 2024-09-26 ENCOUNTER — APPOINTMENT (OUTPATIENT)
Dept: CARDIOLOGY | Facility: CLINIC | Age: 59
End: 2024-09-26

## 2024-09-30 ENCOUNTER — APPOINTMENT (OUTPATIENT)
Dept: CARDIOLOGY | Facility: CLINIC | Age: 59
End: 2024-09-30
Payer: COMMERCIAL

## 2024-09-30 PROCEDURE — 93797 PHYS/QHP OP CAR RHAB WO ECG: CPT

## 2024-10-02 ENCOUNTER — APPOINTMENT (OUTPATIENT)
Dept: CARDIOLOGY | Facility: CLINIC | Age: 59
End: 2024-10-02
Payer: COMMERCIAL

## 2024-10-02 PROCEDURE — 93797 PHYS/QHP OP CAR RHAB WO ECG: CPT

## 2024-10-03 ENCOUNTER — APPOINTMENT (OUTPATIENT)
Dept: CARDIOLOGY | Facility: CLINIC | Age: 59
End: 2024-10-03

## 2024-10-07 ENCOUNTER — APPOINTMENT (OUTPATIENT)
Dept: CARDIOLOGY | Facility: CLINIC | Age: 59
End: 2024-10-07
Payer: COMMERCIAL

## 2024-10-07 PROCEDURE — 93797 PHYS/QHP OP CAR RHAB WO ECG: CPT

## 2024-10-09 ENCOUNTER — APPOINTMENT (OUTPATIENT)
Dept: CARDIOLOGY | Facility: CLINIC | Age: 59
End: 2024-10-09
Payer: COMMERCIAL

## 2024-10-09 PROCEDURE — 93797 PHYS/QHP OP CAR RHAB WO ECG: CPT

## 2024-10-10 ENCOUNTER — APPOINTMENT (OUTPATIENT)
Dept: CARDIOLOGY | Facility: CLINIC | Age: 59
End: 2024-10-10
Payer: COMMERCIAL

## 2024-10-10 PROCEDURE — 93797 PHYS/QHP OP CAR RHAB WO ECG: CPT

## 2024-10-14 ENCOUNTER — APPOINTMENT (OUTPATIENT)
Dept: CARDIOLOGY | Facility: CLINIC | Age: 59
End: 2024-10-14
Payer: COMMERCIAL

## 2024-10-14 PROCEDURE — 93797 PHYS/QHP OP CAR RHAB WO ECG: CPT

## 2024-10-16 ENCOUNTER — APPOINTMENT (OUTPATIENT)
Dept: CARDIOLOGY | Facility: CLINIC | Age: 59
End: 2024-10-16
Payer: COMMERCIAL

## 2024-10-16 PROCEDURE — 93797 PHYS/QHP OP CAR RHAB WO ECG: CPT

## 2024-10-17 ENCOUNTER — APPOINTMENT (OUTPATIENT)
Dept: CARDIOLOGY | Facility: CLINIC | Age: 59
End: 2024-10-17
Payer: COMMERCIAL

## 2024-10-17 PROCEDURE — 93797 PHYS/QHP OP CAR RHAB WO ECG: CPT

## 2024-10-21 ENCOUNTER — APPOINTMENT (OUTPATIENT)
Dept: CARDIOLOGY | Facility: CLINIC | Age: 59
End: 2024-10-21
Payer: COMMERCIAL

## 2024-10-21 PROCEDURE — 93797 PHYS/QHP OP CAR RHAB WO ECG: CPT

## 2024-10-23 ENCOUNTER — APPOINTMENT (OUTPATIENT)
Dept: CARDIOLOGY | Facility: CLINIC | Age: 59
End: 2024-10-23
Payer: COMMERCIAL

## 2024-10-23 PROCEDURE — 93797 PHYS/QHP OP CAR RHAB WO ECG: CPT

## 2024-10-28 ENCOUNTER — APPOINTMENT (OUTPATIENT)
Dept: CARDIOLOGY | Facility: CLINIC | Age: 59
End: 2024-10-28
Payer: COMMERCIAL

## 2024-10-28 PROCEDURE — 93797 PHYS/QHP OP CAR RHAB WO ECG: CPT

## 2024-10-29 ENCOUNTER — NON-APPOINTMENT (OUTPATIENT)
Age: 59
End: 2024-10-29

## 2024-10-30 ENCOUNTER — APPOINTMENT (OUTPATIENT)
Dept: CARDIOLOGY | Facility: CLINIC | Age: 59
End: 2024-10-30
Payer: COMMERCIAL

## 2024-10-30 PROCEDURE — 93797 PHYS/QHP OP CAR RHAB WO ECG: CPT

## 2024-10-31 ENCOUNTER — APPOINTMENT (OUTPATIENT)
Dept: CARDIOLOGY | Facility: CLINIC | Age: 59
End: 2024-10-31
Payer: COMMERCIAL

## 2024-10-31 PROCEDURE — 93797 PHYS/QHP OP CAR RHAB WO ECG: CPT

## 2024-11-04 ENCOUNTER — APPOINTMENT (OUTPATIENT)
Dept: CARDIOLOGY | Facility: CLINIC | Age: 59
End: 2024-11-04
Payer: COMMERCIAL

## 2024-11-04 PROCEDURE — 93797 PHYS/QHP OP CAR RHAB WO ECG: CPT

## 2024-11-06 ENCOUNTER — APPOINTMENT (OUTPATIENT)
Dept: CARDIOLOGY | Facility: CLINIC | Age: 59
End: 2024-11-06
Payer: COMMERCIAL

## 2024-11-06 PROCEDURE — 93797 PHYS/QHP OP CAR RHAB WO ECG: CPT

## 2024-11-07 ENCOUNTER — APPOINTMENT (OUTPATIENT)
Dept: CARDIOLOGY | Facility: CLINIC | Age: 59
End: 2024-11-07
Payer: COMMERCIAL

## 2024-11-07 PROCEDURE — 93797 PHYS/QHP OP CAR RHAB WO ECG: CPT

## 2024-11-11 ENCOUNTER — APPOINTMENT (OUTPATIENT)
Dept: CARDIOLOGY | Facility: CLINIC | Age: 59
End: 2024-11-11
Payer: COMMERCIAL

## 2024-11-11 ENCOUNTER — NON-APPOINTMENT (OUTPATIENT)
Age: 59
End: 2024-11-11

## 2024-11-11 PROCEDURE — 93797 PHYS/QHP OP CAR RHAB WO ECG: CPT

## 2024-11-20 ENCOUNTER — APPOINTMENT (OUTPATIENT)
Dept: ORTHOPEDIC SURGERY | Facility: CLINIC | Age: 59
End: 2024-11-20
Payer: COMMERCIAL

## 2024-11-20 VITALS
BODY MASS INDEX: 26.13 KG/M2 | HEIGHT: 62 IN | RESPIRATION RATE: 16 BRPM | WEIGHT: 142 LBS | HEART RATE: 83 BPM | SYSTOLIC BLOOD PRESSURE: 113 MMHG | OXYGEN SATURATION: 98 % | DIASTOLIC BLOOD PRESSURE: 73 MMHG

## 2024-11-20 DIAGNOSIS — M25.511 PAIN IN RIGHT SHOULDER: ICD-10-CM

## 2024-11-20 DIAGNOSIS — G89.29 PAIN IN RIGHT SHOULDER: ICD-10-CM

## 2024-11-20 PROCEDURE — 73030 X-RAY EXAM OF SHOULDER: CPT | Mod: RT

## 2024-11-20 PROCEDURE — 20610 DRAIN/INJ JOINT/BURSA W/O US: CPT | Mod: RT

## 2024-11-20 PROCEDURE — 99203 OFFICE O/P NEW LOW 30 MIN: CPT | Mod: 25

## 2024-12-16 ENCOUNTER — APPOINTMENT (OUTPATIENT)
Dept: DERMATOLOGY | Facility: CLINIC | Age: 59
End: 2024-12-16
Payer: COMMERCIAL

## 2024-12-16 ENCOUNTER — NON-APPOINTMENT (OUTPATIENT)
Age: 59
End: 2024-12-16

## 2024-12-16 DIAGNOSIS — L21.9 SEBORRHEIC DERMATITIS, UNSPECIFIED: ICD-10-CM

## 2024-12-16 DIAGNOSIS — L29.9 PRURITUS, UNSPECIFIED: ICD-10-CM

## 2024-12-16 DIAGNOSIS — Z12.83 ENCOUNTER FOR SCREENING FOR MALIGNANT NEOPLASM OF SKIN: ICD-10-CM

## 2024-12-16 DIAGNOSIS — D18.01 HEMANGIOMA OF SKIN AND SUBCUTANEOUS TISSUE: ICD-10-CM

## 2024-12-16 DIAGNOSIS — L85.3 XEROSIS CUTIS: ICD-10-CM

## 2024-12-16 DIAGNOSIS — D22.9 MELANOCYTIC NEVI, UNSPECIFIED: ICD-10-CM

## 2024-12-16 DIAGNOSIS — L64.9 ANDROGENIC ALOPECIA, UNSPECIFIED: ICD-10-CM

## 2024-12-16 DIAGNOSIS — D23.9 OTHER BENIGN NEOPLASM OF SKIN, UNSPECIFIED: ICD-10-CM

## 2024-12-16 DIAGNOSIS — L30.9 DERMATITIS, UNSPECIFIED: ICD-10-CM

## 2024-12-16 PROCEDURE — 99204 OFFICE O/P NEW MOD 45 MIN: CPT

## 2024-12-16 RX ORDER — KETOCONAZOLE 20 MG/ML
2 SUSPENSION TOPICAL
Qty: 1 | Refills: 6 | Status: ACTIVE | COMMUNITY
Start: 2024-12-16 | End: 1900-01-01

## 2024-12-16 RX ORDER — HYDROCORTISONE 25 MG/G
2.5 OINTMENT TOPICAL
Qty: 1 | Refills: 2 | Status: ACTIVE | COMMUNITY
Start: 2024-12-16 | End: 1900-01-01

## 2024-12-23 ENCOUNTER — NON-APPOINTMENT (OUTPATIENT)
Age: 59
End: 2024-12-23

## 2024-12-24 ENCOUNTER — APPOINTMENT (OUTPATIENT)
Dept: ORTHOPEDIC SURGERY | Facility: CLINIC | Age: 59
End: 2024-12-24

## 2024-12-31 ENCOUNTER — APPOINTMENT (OUTPATIENT)
Dept: DERMATOLOGY | Facility: CLINIC | Age: 59
End: 2024-12-31

## 2024-12-31 ENCOUNTER — NON-APPOINTMENT (OUTPATIENT)
Age: 59
End: 2024-12-31

## 2025-04-07 ENCOUNTER — APPOINTMENT (OUTPATIENT)
Dept: DERMATOLOGY | Facility: CLINIC | Age: 60
End: 2025-04-07